# Patient Record
Sex: OTHER/UNKNOWN | URBAN - METROPOLITAN AREA
[De-identification: names, ages, dates, MRNs, and addresses within clinical notes are randomized per-mention and may not be internally consistent; named-entity substitution may affect disease eponyms.]

---

## 2017-09-21 ENCOUNTER — HISTORICAL (OUTPATIENT)
Dept: ADMINISTRATIVE | Facility: HOSPITAL | Age: 42
End: 2017-09-21

## 2017-09-21 LAB
ABS NEUT (OLG): 7.52 X10(3)/MCL (ref 2.1–9.2)
ALBUMIN SERPL-MCNC: 3.9 GM/DL (ref 3.4–5)
ALBUMIN/GLOB SERPL: 1.2 RATIO (ref 1.1–2)
ALP SERPL-CCNC: 54 UNIT/L (ref 50–136)
ALT SERPL-CCNC: 32 UNIT/L (ref 12–78)
AMYLASE SERPL-CCNC: 38 UNIT/L (ref 25–115)
APTT PPP: 24 SECOND(S) (ref 20.6–36)
AST SERPL-CCNC: 18 UNIT/L (ref 15–37)
BASOPHILS # BLD AUTO: 0 X10(3)/MCL (ref 0–0.2)
BASOPHILS NFR BLD AUTO: 0 %
BILIRUB SERPL-MCNC: 0.6 MG/DL (ref 0.2–1)
BILIRUBIN DIRECT+TOT PNL SERPL-MCNC: 0.2 MG/DL (ref 0–0.5)
BILIRUBIN DIRECT+TOT PNL SERPL-MCNC: 0.4 MG/DL (ref 0–0.8)
BUN SERPL-MCNC: 14 MG/DL (ref 7–18)
CALCIUM SERPL-MCNC: 9.6 MG/DL (ref 8.5–10.1)
CHLORIDE SERPL-SCNC: 108 MMOL/L (ref 98–107)
CO2 SERPL-SCNC: 23 MMOL/L (ref 21–32)
CREAT SERPL-MCNC: 1.26 MG/DL (ref 0.7–1.3)
EOSINOPHIL # BLD AUTO: 0.2 X10(3)/MCL (ref 0–0.9)
EOSINOPHIL NFR BLD AUTO: 2 %
ERYTHROCYTE [DISTWIDTH] IN BLOOD BY AUTOMATED COUNT: 14.2 % (ref 11.5–17)
ETHANOL SERPL-MCNC: 60 MG/DL (ref 0–3)
GLOBULIN SER-MCNC: 3.2 GM/DL (ref 2.4–3.5)
GLUCOSE SERPL-MCNC: 104 MG/DL (ref 74–106)
HCT VFR BLD AUTO: 47.3 % (ref 42–52)
HGB BLD-MCNC: 16.5 GM/DL (ref 14–18)
INR PPP: 1.01 (ref 0–1.27)
LACTATE SERPL-SCNC: 2.1 MMOL/L (ref 0.4–2)
LIPASE SERPL-CCNC: 143 UNIT/L (ref 73–393)
LYMPHOCYTES # BLD AUTO: 2 X10(3)/MCL (ref 0.6–4.6)
LYMPHOCYTES NFR BLD AUTO: 19 %
MCH RBC QN AUTO: 33.2 PG (ref 27–31)
MCHC RBC AUTO-ENTMCNC: 34.9 GM/DL (ref 33–36)
MCV RBC AUTO: 95.2 FL (ref 80–94)
MONOCYTES # BLD AUTO: 0.6 X10(3)/MCL (ref 0.1–1.3)
MONOCYTES NFR BLD AUTO: 5 %
NEUTROPHILS # BLD AUTO: 7.52 X10(3)/MCL (ref 1.4–7.9)
NEUTROPHILS NFR BLD AUTO: 72 %
PLATELET # BLD AUTO: 219 X10(3)/MCL (ref 130–400)
PMV BLD AUTO: 10.1 FL (ref 9.4–12.4)
POTASSIUM SERPL-SCNC: 4.4 MMOL/L (ref 3.5–5.1)
PROT SERPL-MCNC: 7.1 GM/DL (ref 6.4–8.2)
PROTHROMBIN TIME: 13.1 SECOND(S) (ref 12.1–14.2)
RBC # BLD AUTO: 4.97 X10(6)/MCL (ref 4.7–6.1)
SODIUM SERPL-SCNC: 142 MMOL/L (ref 136–145)
WBC # SPEC AUTO: 10.4 X10(3)/MCL (ref 4.5–11.5)

## 2024-12-31 ENCOUNTER — HOSPITAL ENCOUNTER (INPATIENT)
Facility: HOSPITAL | Age: 49
LOS: 6 days | Discharge: HOME | DRG: 065 | End: 2025-01-06
Attending: STUDENT IN AN ORGANIZED HEALTH CARE EDUCATION/TRAINING PROGRAM | Admitting: STUDENT IN AN ORGANIZED HEALTH CARE EDUCATION/TRAINING PROGRAM
Payer: MEDICAID

## 2024-12-31 DIAGNOSIS — R29.818 FOCAL NEUROLOGICAL DEFICIT: ICD-10-CM

## 2024-12-31 DIAGNOSIS — I61.0 BASAL GANGLIA HEMORRHAGE: Primary | ICD-10-CM

## 2024-12-31 DIAGNOSIS — R29.818 ACUTE FOCAL NEUROLOGICAL DEFICIT: ICD-10-CM

## 2024-12-31 DIAGNOSIS — S06.320A INTRAPARENCHYMAL HEMATOMA OF BRAIN, LEFT, WITHOUT LOSS OF CONSCIOUSNESS, INITIAL ENCOUNTER: ICD-10-CM

## 2024-12-31 DIAGNOSIS — I63.9 CEREBROVASCULAR ACCIDENT (CVA), UNSPECIFIED MECHANISM: ICD-10-CM

## 2024-12-31 DIAGNOSIS — L53.9 REDNESS: ICD-10-CM

## 2024-12-31 LAB
ALBUMIN SERPL-MCNC: 4 G/DL (ref 3.5–5)
ALBUMIN/GLOB SERPL: 1.6 RATIO (ref 1.1–2)
ALP SERPL-CCNC: 63 UNIT/L (ref 40–150)
ALT SERPL-CCNC: 30 UNIT/L (ref 0–55)
ANION GAP SERPL CALC-SCNC: 7 MEQ/L
AST SERPL-CCNC: 14 UNIT/L (ref 5–34)
BASOPHILS # BLD AUTO: 0.04 X10(3)/MCL
BASOPHILS NFR BLD AUTO: 0.5 %
BILIRUB SERPL-MCNC: 0.5 MG/DL
BUN SERPL-MCNC: 17.6 MG/DL (ref 8.9–20.6)
CALCIUM SERPL-MCNC: 9 MG/DL (ref 8.4–10.2)
CHLORIDE SERPL-SCNC: 107 MMOL/L (ref 98–107)
CHOLEST SERPL-MCNC: 188 MG/DL
CHOLEST/HDLC SERPL: 5 {RATIO} (ref 0–5)
CO2 SERPL-SCNC: 25 MMOL/L (ref 22–29)
CREAT SERPL-MCNC: 1.24 MG/DL (ref 0.72–1.25)
CREAT/UREA NIT SERPL: 14
EOSINOPHIL # BLD AUTO: 0.2 X10(3)/MCL (ref 0–0.9)
EOSINOPHIL NFR BLD AUTO: 2.5 %
ERYTHROCYTE [DISTWIDTH] IN BLOOD BY AUTOMATED COUNT: 14.1 % (ref 11.5–17)
GFR SERPLBLD CREATININE-BSD FMLA CKD-EPI: 54 ML/MIN/1.73/M2
GLOBULIN SER-MCNC: 2.5 GM/DL (ref 2.4–3.5)
GLUCOSE SERPL-MCNC: 114 MG/DL (ref 74–100)
HCT VFR BLD AUTO: 47.7 % (ref 42–52)
HDLC SERPL-MCNC: 41 MG/DL (ref 35–60)
HGB BLD-MCNC: 16.1 G/DL (ref 14–18)
IMM GRANULOCYTES # BLD AUTO: 0.04 X10(3)/MCL (ref 0–0.04)
IMM GRANULOCYTES NFR BLD AUTO: 0.5 %
INR PPP: 1
LDLC SERPL CALC-MCNC: 111 MG/DL (ref 50–140)
LYMPHOCYTES # BLD AUTO: 1.74 X10(3)/MCL (ref 0.6–4.6)
LYMPHOCYTES NFR BLD AUTO: 21.8 %
MCH RBC QN AUTO: 31.4 PG (ref 27–31)
MCHC RBC AUTO-ENTMCNC: 33.8 G/DL (ref 33–36)
MCV RBC AUTO: 93 FL (ref 80–94)
MONOCYTES # BLD AUTO: 0.65 X10(3)/MCL (ref 0.1–1.3)
MONOCYTES NFR BLD AUTO: 8.2 %
NEUTROPHILS # BLD AUTO: 5.3 X10(3)/MCL (ref 2.1–9.2)
NEUTROPHILS NFR BLD AUTO: 66.5 %
NRBC BLD AUTO-RTO: 0 %
OHS QRS DURATION: 110 MS
OHS QTC CALCULATION: 458 MS
PLATELET # BLD AUTO: 176 X10(3)/MCL (ref 130–400)
PMV BLD AUTO: 10.5 FL (ref 7.4–10.4)
POCT GLUCOSE: 124 MG/DL (ref 70–110)
POTASSIUM SERPL-SCNC: 4 MMOL/L (ref 3.5–5.1)
PROT SERPL-MCNC: 6.5 GM/DL (ref 6.4–8.3)
PROTHROMBIN TIME: 13.5 SECONDS (ref 12.5–14.5)
RBC # BLD AUTO: 5.13 X10(6)/MCL
SODIUM SERPL-SCNC: 139 MMOL/L (ref 136–145)
TRIGL SERPL-MCNC: 178 MG/DL (ref 34–140)
TROPONIN I SERPL-MCNC: <0.01 NG/ML (ref 0–0.04)
TSH SERPL-ACNC: 1.66 UIU/ML (ref 0.35–4.94)
VLDLC SERPL CALC-MCNC: 36 MG/DL
WBC # BLD AUTO: 7.97 X10(3)/MCL (ref 4.5–11.5)

## 2024-12-31 PROCEDURE — 82962 GLUCOSE BLOOD TEST: CPT

## 2024-12-31 PROCEDURE — 99291 CRITICAL CARE FIRST HOUR: CPT

## 2024-12-31 PROCEDURE — S4991 NICOTINE PATCH NONLEGEND: HCPCS

## 2024-12-31 PROCEDURE — 25000003 PHARM REV CODE 250: Performed by: NURSE PRACTITIONER

## 2024-12-31 PROCEDURE — 63600175 PHARM REV CODE 636 W HCPCS

## 2024-12-31 PROCEDURE — 80053 COMPREHEN METABOLIC PANEL: CPT | Performed by: NURSE PRACTITIONER

## 2024-12-31 PROCEDURE — 80061 LIPID PANEL: CPT | Performed by: NURSE PRACTITIONER

## 2024-12-31 PROCEDURE — 25500020 PHARM REV CODE 255: Performed by: STUDENT IN AN ORGANIZED HEALTH CARE EDUCATION/TRAINING PROGRAM

## 2024-12-31 PROCEDURE — 99233 SBSQ HOSP IP/OBS HIGH 50: CPT | Mod: ,,, | Performed by: PSYCHIATRY & NEUROLOGY

## 2024-12-31 PROCEDURE — 63600175 PHARM REV CODE 636 W HCPCS: Performed by: STUDENT IN AN ORGANIZED HEALTH CARE EDUCATION/TRAINING PROGRAM

## 2024-12-31 PROCEDURE — 63600175 PHARM REV CODE 636 W HCPCS: Mod: JZ,JG

## 2024-12-31 PROCEDURE — 99223 1ST HOSP IP/OBS HIGH 75: CPT | Mod: FS,,, | Performed by: NEUROLOGICAL SURGERY

## 2024-12-31 PROCEDURE — 20000000 HC ICU ROOM

## 2024-12-31 PROCEDURE — 84484 ASSAY OF TROPONIN QUANT: CPT | Performed by: STUDENT IN AN ORGANIZED HEALTH CARE EDUCATION/TRAINING PROGRAM

## 2024-12-31 PROCEDURE — 93005 ELECTROCARDIOGRAM TRACING: CPT

## 2024-12-31 PROCEDURE — 84443 ASSAY THYROID STIM HORMONE: CPT | Performed by: NURSE PRACTITIONER

## 2024-12-31 PROCEDURE — C9248 INJ, CLEVIDIPINE BUTYRATE: HCPCS | Performed by: STUDENT IN AN ORGANIZED HEALTH CARE EDUCATION/TRAINING PROGRAM

## 2024-12-31 PROCEDURE — 85025 COMPLETE CBC W/AUTO DIFF WBC: CPT | Performed by: NURSE PRACTITIONER

## 2024-12-31 PROCEDURE — 85610 PROTHROMBIN TIME: CPT | Performed by: NURSE PRACTITIONER

## 2024-12-31 PROCEDURE — 93010 ELECTROCARDIOGRAM REPORT: CPT | Mod: ,,, | Performed by: INTERNAL MEDICINE

## 2024-12-31 PROCEDURE — 96374 THER/PROPH/DIAG INJ IV PUSH: CPT

## 2024-12-31 PROCEDURE — 25000003 PHARM REV CODE 250

## 2024-12-31 PROCEDURE — 63600175 PHARM REV CODE 636 W HCPCS: Performed by: NURSE PRACTITIONER

## 2024-12-31 PROCEDURE — 25000003 PHARM REV CODE 250: Performed by: STUDENT IN AN ORGANIZED HEALTH CARE EDUCATION/TRAINING PROGRAM

## 2024-12-31 PROCEDURE — 80047 BASIC METABLC PNL IONIZED CA: CPT

## 2024-12-31 RX ORDER — NICARDIPINE HYDROCHLORIDE 0.2 MG/ML
0-15 INJECTION INTRAVENOUS CONTINUOUS
Status: DISCONTINUED | OUTPATIENT
Start: 2024-12-31 | End: 2024-12-31

## 2024-12-31 RX ORDER — CARVEDILOL 3.12 MG/1
6.25 TABLET ORAL 2 TIMES DAILY
Status: DISCONTINUED | OUTPATIENT
Start: 2024-12-31 | End: 2025-01-06 | Stop reason: HOSPADM

## 2024-12-31 RX ORDER — LABETALOL HYDROCHLORIDE 5 MG/ML
10 INJECTION, SOLUTION INTRAVENOUS
Status: COMPLETED | OUTPATIENT
Start: 2024-12-31 | End: 2024-12-31

## 2024-12-31 RX ORDER — MUPIROCIN 20 MG/G
OINTMENT TOPICAL 2 TIMES DAILY
Status: DISPENSED | OUTPATIENT
Start: 2024-12-31 | End: 2025-01-05

## 2024-12-31 RX ORDER — IBUPROFEN 200 MG
1 TABLET ORAL DAILY
Status: DISCONTINUED | OUTPATIENT
Start: 2024-12-31 | End: 2025-01-06 | Stop reason: HOSPADM

## 2024-12-31 RX ORDER — ESMOLOL HYDROCHLORIDE 20 MG/ML
0-300 INJECTION, SOLUTION INTRAVENOUS CONTINUOUS
Status: DISCONTINUED | OUTPATIENT
Start: 2024-12-31 | End: 2025-01-03

## 2024-12-31 RX ORDER — LABETALOL HYDROCHLORIDE 5 MG/ML
10 INJECTION, SOLUTION INTRAVENOUS
Status: DISCONTINUED | OUTPATIENT
Start: 2024-12-31 | End: 2025-01-05

## 2024-12-31 RX ORDER — IBUPROFEN 200 MG
1 TABLET ORAL DAILY
Status: DISCONTINUED | OUTPATIENT
Start: 2025-01-01 | End: 2024-12-31

## 2024-12-31 RX ORDER — LOSARTAN POTASSIUM 50 MG/1
50 TABLET ORAL DAILY
Status: DISCONTINUED | OUTPATIENT
Start: 2024-12-31 | End: 2025-01-02

## 2024-12-31 RX ORDER — ESMOLOL HYDROCHLORIDE 10 MG/ML
500 INJECTION INTRAVENOUS ONCE
Status: COMPLETED | OUTPATIENT
Start: 2024-12-31 | End: 2024-12-31

## 2024-12-31 RX ORDER — BISACODYL 10 MG/1
10 SUPPOSITORY RECTAL DAILY PRN
Status: DISCONTINUED | OUTPATIENT
Start: 2024-12-31 | End: 2025-01-06 | Stop reason: HOSPADM

## 2024-12-31 RX ADMIN — LABETALOL HYDROCHLORIDE 10 MG: 5 INJECTION, SOLUTION INTRAVENOUS at 03:12

## 2024-12-31 RX ADMIN — CLEVIPIDINE 14 MG/HR: 0.5 EMULSION INTRAVENOUS at 05:12

## 2024-12-31 RX ADMIN — CLEVIPIDINE 16 MG/HR: 0.5 EMULSION INTRAVENOUS at 03:12

## 2024-12-31 RX ADMIN — CLEVIPIDINE 8 MG/HR: 0.5 EMULSION INTRAVENOUS at 08:12

## 2024-12-31 RX ADMIN — ESMOLOL HYDROCHLORIDE 50 MCG/KG/MIN: 20 INJECTION INTRAVENOUS at 04:12

## 2024-12-31 RX ADMIN — CLEVIPIDINE 16 MG/HR: 0.5 EMULSION INTRAVENOUS at 02:12

## 2024-12-31 RX ADMIN — CLEVIPIDINE 5 MG/HR: 0.5 EMULSION INTRAVENOUS at 10:12

## 2024-12-31 RX ADMIN — CLEVIPIDINE 2 MG/HR: 0.5 EMULSION INTRAVENOUS at 10:12

## 2024-12-31 RX ADMIN — CLEVIPIDINE 16 MG/HR: 0.5 EMULSION INTRAVENOUS at 11:12

## 2024-12-31 RX ADMIN — IOHEXOL 100 ML: 350 INJECTION, SOLUTION INTRAVENOUS at 09:12

## 2024-12-31 RX ADMIN — LABETALOL HYDROCHLORIDE 10 MG: 5 INJECTION, SOLUTION INTRAVENOUS at 10:12

## 2024-12-31 RX ADMIN — ESMOLOL HYDROCHLORIDE 70 MCG/KG/MIN: 20 INJECTION INTRAVENOUS at 08:12

## 2024-12-31 RX ADMIN — CLEVIPIDINE 16 MG/HR: 0.5 EMULSION INTRAVENOUS at 12:12

## 2024-12-31 RX ADMIN — LEVETIRACETAM 500 MG: 100 INJECTION, SOLUTION INTRAVENOUS at 12:12

## 2024-12-31 RX ADMIN — NICOTINE 1 PATCH: 21 PATCH, EXTENDED RELEASE TRANSDERMAL at 06:12

## 2024-12-31 RX ADMIN — MUPIROCIN: 20 OINTMENT TOPICAL at 08:12

## 2024-12-31 RX ADMIN — ESMOLOL HYDROCHLORIDE 50000 MCG: 100 INJECTION, SOLUTION INTRAVENOUS at 11:12

## 2024-12-31 RX ADMIN — LEVETIRACETAM 500 MG: 100 INJECTION, SOLUTION INTRAVENOUS at 08:12

## 2024-12-31 NOTE — H&P
[facility] - Emergency Dept  Pulmonary Critical Care Note    Patient Name: Ramon oPp  MRN: [id number]  Admission Date: 12/31/2024  Hospital Length of Stay: 0 days  Code Status: No Order  Attending Provider: Jean Pierre Colon MD  Primary Care Provider: No primary care provider on file.     Subjective:     HPI:   Ramon Pop is a 49 y.o. adult with no significant PMH who presented to the ED on 12/31/2024 with CC of stroke like symptoms. Patient states onset was this morning when he noticed his facial drooping with slurred speech and blurry vision. He also noticed weakness of right UE and LE. He states he could walk but feeling off-balance. He denies health issues and has no primary care physician. He admits smoking since 13 y.o. and been smoking 2 ppd x 30 years. He adds heavily drinking alcohol for 10 years and cutting down to 0.5-1 pint per month for past 3 years - last intake 2 weeks ago. He uses marijuana regularly. Endorses cocaine use-last intake 2 weeks ago. He denies HA, dizziness, CP, SOB, abdominal pain, urinary difficulties, constipation/diarrhea, and fall.     In ED, upon arrival /155. Other V/S stable. Given labetalol IV 10 mg and started on clevidipine.  CTH showed left basal ganglial hemorrhage without evidence of aneurysm or large vessel occlusion. Neurology and neurosurgery consulted. Critical care medicine consulted for admission to ICU for BP management and Q1H neurocheck.     Hospital Course/Significant events:  12/31/2024 - Admitted to ICU     24 Hour Interval History:  Pending    No past medical history on file.    No past surgical history on file.         No current outpatient medications  Current Inpatient Medications    Current Intravenous Infusions   clevidipine  0-16 mg/hr Intravenous Continuous 32 mL/hr at 12/31/24 1129 16 mg/hr at 12/31/24 1129    nicardipine  0-15 mg/hr Intravenous Continuous         ROS: neg unless stated above        Objective:     Intake/Output  "Summary (Last 24 hours) at 12/31/2024 1139  Last data filed at 12/31/2024 1031  Gross per 24 hour   Intake 4.74 ml   Output --   Net 4.74 ml     Vital Signs (Most Recent):  Temp: 98 °F (36.7 °C) (12/31/24 0930)  Pulse: 83 (12/31/24 1025)  Resp: 19 (12/31/24 1025)  BP: (!) 165/102 (12/31/24 1126)  SpO2: 100 % (12/31/24 1025)  Body mass index is 31.12 kg/m².  Weight: 99.8 kg (220 lb) Vital Signs (24h Range):  Temp:  [98 °F (36.7 °C)] 98 °F (36.7 °C)  Pulse:  [71-92] 83  Resp:  [15-20] 19  SpO2:  [95 %-100 %] 100 %  BP: (165-220)/(102-155) 165/102     Physical Exam:  General: Well nourished w/o distress  HEENT: NC/AT; PERRL; nasal and oral mucosa moist and clear  FACE: Dysarthria. Right facial drooping with asymmetrical smile  Pulm: CTA bilaterally, normal work of breathing  CV: S1, S2 w/o murmurs or gallops; no edema noted  GI: Bowel sound present in all quadrants  MSK: Full ROM of all extremities.    Derm: No rashes, abnormal bruising, or skin lesions  Neuro: AAOx4. Motor strength on RUE 4/5 and LUE 5/5.   Psych: Cooperative; appropriate mood and affect    Lines/Drains/Airways       Peripheral Intravenous Line  Duration                  Peripheral IV - Single Lumen 12/31/24 0940 18 G Anterior;Left Forearm <1 day         Peripheral IV - Single Lumen 12/31/24 1042 18 G Anterior;Right Forearm <1 day                  Significant Labs:  Lab Results   Component Value Date    WBC 7.97 12/31/2024    HGB 16.1 12/31/2024    HCT 47.7 12/31/2024    MCV 93.0 12/31/2024     12/31/2024       BMP  Lab Results   Component Value Date     12/31/2024    K 4.0 12/31/2024    CO2 25 12/31/2024    BUN 17.6 12/31/2024    CREATININE 1.24 12/31/2024    CALCIUM 9.0 12/31/2024    AGAP 7.0 12/31/2024    EGFRNONAA >60 09/21/2017     ABG  No results for input(s): "PH", "PO2", "PCO2", "HCO3", "BE" in the last 168 hours.  Mechanical Ventilation Support:       Significant Imaging:  I have reviewed the pertinent imaging within the past 24 " hours.    Assessment/Plan:     Assessment  Left basal ganglia hemorrhage  Emergency HTN  Tobacco dependent  Polysubstance use     Plan  Continue to monitor patient at ICU  Hourly neurological exams  Will repeat CTH at 1600 (12/31/2024)  BP control with -150 for first 24 hours. Then control SBP <140  Continue Keppra 500 mg IV Q12H for seizure precaution  Avoid anticoagulation/antiplatelet   PT/OT evaluation  Neurology and neurosurgery following    DVT Prophylaxis: SCD  GI Prophylaxis: None     32 minutes of critical care was time spent personally by me on the following activities: development of treatment plan with patient or surrogate and bedside caregivers, discussions with consultants, evaluation of patient's response to treatment, examination of patient, ordering and performing treatments and interventions, ordering and review of laboratory studies, ordering and review of radiographic studies, pulse oximetry, re-evaluation of patient's condition.  This critical care time did not overlap with that of any other provider or involve time for any procedures.     Jose Pierce MD PGY-1  Pulmonary Critical Care Medicine  [facility] - [facility] Dept

## 2024-12-31 NOTE — CONSULTS
[facility] - Emergency Dept  Neurology  Consult Note      Ramon Pop is a 49 y.o. adult who presented to [facility] on 12/31/2024 with reports of weakness of right arm(s), right hand(s), right upper leg(s), or right lower leg(s), right facial droop, slurred speech. Onset of symptoms was sudden, not related to any specific activity. Stroke risk factors include hypertension. Prior stroke history: unknown.       No past medical history on file.  No past surgical history on file.  No family history on file.      Review of patient's allergies indicates:   Allergen Reactions    Penicillins          STROKE DOCUMENTATION   Acute Stroke Times   Last Known Normal Date: 12/31/24  Last Known Normal Time: 0845  Symptom Onset Date: 12/31/24  Symptom Onset Time: 0845  Stroke Team Called Date: 12/31/24  Stroke Team Called Time: 0933  Stroke Team Arrival Date: 12/31/24  Stroke Team Arrival Time: 0936  Thrombolytic Therapy Recommended: No (CTH with new ICH)  Thrombectomy Recommended: No (CTH with new ICH)    NIH Scale:  1a. Level of Consciousness: 0-->Alert, keenly responsive  1b. LOC Questions: 0-->Answers both questions correctly  1c. LOC Commands: 0-->Performs both tasks correctly  2. Best Gaze: 0-->Normal  3. Visual: 0-->No visual loss  4. Facial Palsy: 1-->Minor paralysis (flattened nasolabial fold, asymmetry on smiling)  5a. Motor Arm, Left: 0-->No drift, limb holds 90 (or 45) degrees for full 10 secs  5b. Motor Arm, Right: 1-->Drift, limb holds 90 (or 45) degrees, but drifts down before full 10 secs, does not hit bed or other support  6a. Motor Leg, Left: 0-->No drift, leg holds 30 degree position for full 5 secs  6b. Motor Leg, Right: 1-->Drift, leg falls by the end of the 5-sec period but does not hit bed  7. Limb Ataxia: 0-->Absent  8. Sensory: 0-->Normal, no sensory loss  9. Best Language: 0-->No aphasia, normal  10. Dysarthria: 1-->Mild-to-moderate dysarthria, patient slurs at least some words and, at worst,  "can be understood with some difficulty  11. Extinction and Inattention (formerly Neglect): 0-->No abnormality  Total (NIH Stroke Scale): 4     Modified [place] Score: 2  [place] Coma Scale:15   ABCD2 Score:    [id number] Score:   HAS -BLED Score:   ICH Score:   Hunt & Ellis Classification:         Neurological Exam:   Speech: Dysarthria  Orientation: Person, Place, Time  Visual Fields (CN II): Full  EOM (CN III, IV, VI): Full/intact  Facial Sensation (CN V): Symmetric, Corneal reflex intact  Facial Movement (CN VII): upper weakness right upper and right lower and lower weakness right upper and right lower  Motor*: Arm Left:  Normal (5/5), Leg Left:   Normal (5/5), Arm Right:   Paretic:  4/5, Leg Right:   Paretic:  4/5  Sensation: intact to light touch, temperature and vibration        Laboratory:  BMP:   Lab Results   Component Value Date    GLUCOSE 104 09/21/2017     09/21/2017    K 4.4 09/21/2017     (H) 09/21/2017    CO2 23.0 09/21/2017    BUN 14.0 09/21/2017    CREATININE 1.26 09/21/2017    CALCIUM 9.6 09/21/2017     CBC:   Lab Results   Component Value Date    WBC 10.4 09/21/2017    RBC 4.97 09/21/2017    HGB 16.5 09/21/2017    HCT 47.3 09/21/2017     09/21/2017    MCV 95.2 (H) 09/21/2017    MCH 33.2 (H) 09/21/2017    MCHC 34.9 09/21/2017     Lipid Panel: No results found for: "CHOL", "LDLCALC", "HDL", "TRIG"  Coagulation:   Lab Results   Component Value Date    INR 1.01 09/21/2017    APTT 24.0 09/21/2017     Hgb A1C: No results found for: "HGBA1C"  TSH: No results found for: "TSH"    Diagnostic Results  Brain Imaging:   -CTh: positive for ICH  Cerebrovascular Imaging:   -CTA h/n: awaiting results         Discussed with neurologist on call: Dr. Pang notified of stroke alert at 0936; updated on ICH at 0945.  Thrombolysis Candidate? No, CT findings (ICH, SAH, Large core infarct)   -Delays to Thrombolysis?  Not Applicable    Interventional Revascularization Candidate? Awaiting CTA results " from [facility] for determination   -Delays to Thrombectomy? Not Applicable    Hemorrhagic change of an Ischemic Stroke: Does this patient have an ischemic stroke with hemorrhagic changes? No           PLAN:  IPH -      -hourly neuro checks ... notify neurology of any neuro change  -repeat CTH in 6 hours (1600)  -consult to neurosurgery  -strict blood pressure control ... -150 for the first 24 hours, then SBP less than 140  -avoid antiplatelet or anticoagulation at this time  -seizure precautions ... notify neurology of any seizure-like activity  -therapy evaluations     Further recommendations to follow from MD.      Maria R Perez, P  Neurology  [facility] - Emergency Dept      I have seen/examined the patient.  NP was scribe.  I agree with the findings unless outlined below:    Juan Pang MD  [facility]     Pt seen/examined  Mild R hemiparesis/facial droop  Ct shows L BG ICH  Hypertensive    BP parameters as above  Signing off

## 2024-12-31 NOTE — ED PROVIDER NOTES
Encounter Date: 12/31/2024       History     Chief Complaint   Patient presents with    Cerebrovascular Accident     Right arm weakness, right facial droop, slurred speech LSN 0845am. Woke up normal     Ramon Pop is a 49 y.o. adult with a past medical history of HTN (uncontrolled) who presents to the ED for acute onset of right-sided facial droop, slurred speech, right arm weakness that occurred at approximately 8:45 a.m. this morning.  He states he felt normal when he awoke from sleep this morning when his symptoms came on all of a sudden.  He states his only history is unknown hypertension although he is not currently on any treatment for this.         Review of patient's allergies indicates:   Allergen Reactions    Penicillins      No past medical history on file.  No past surgical history on file.  No family history on file.     Review of Systems   Constitutional:  Negative for chills and fever.   HENT:  Negative for drooling and sore throat.    Eyes:  Negative for pain and redness.   Respiratory:  Negative for shortness of breath, wheezing and stridor.    Cardiovascular:  Negative for chest pain, palpitations and leg swelling.   Gastrointestinal:  Negative for abdominal pain, nausea and vomiting.   Genitourinary:  Negative for dysuria and hematuria.   Musculoskeletal:  Negative for back pain, neck pain and neck stiffness.   Skin:  Negative for rash and wound.   Neurological:  Positive for facial asymmetry, speech difficulty and weakness. Negative for numbness.   Hematological:  Does not bruise/bleed easily.       Physical Exam     Initial Vitals [12/31/24 0930]   BP Pulse Resp Temp SpO2   (!) 220/155 92 20 98 °F (36.7 °C) 97 %      MAP       --         Physical Exam    Nursing note and vitals reviewed.  Constitutional: Ramon appears well-developed and well-nourished.   HENT:   Head: Atraumatic.   Eyes: EOM are normal.   Neck: Neck supple.   Cardiovascular:  Normal rate and regular rhythm.            Pulmonary/Chest: Breath sounds normal. No respiratory distress.   Abdominal: Abdomen is soft. Bowel sounds are normal.   Musculoskeletal:      Cervical back: Neck supple.     Neurological: Ramon is alert and oriented to person, place, and time. A cranial nerve deficit and sensory deficit is present.   Right upper extremity weakness 4/5, right lower extremity weakness 4/5          ED Course   Critical Care    Date/Time: 12/31/2024 10:29 AM    Performed by: Chung Santoyo MD  Authorized by: Chung Santoyo MD  Direct patient critical care time: 40 minutes  Total critical care time (exclusive of procedural time) : 40 minutes  Critical care time was exclusive of separately billable procedures and treating other patients.  Critical care was necessary to treat or prevent imminent or life-threatening deterioration of the following conditions: CNS failure or compromise.  Critical care was time spent personally by me on the following activities: discussions with consultants, development of treatment plan with patient or surrogate, evaluation of patient's response to treatment, examination of patient, obtaining history from patient or surrogate, ordering and performing treatments and interventions, ordering and review of laboratory studies, ordering and review of radiographic studies, pulse oximetry, re-evaluation of patient's condition and review of old charts.        Labs Reviewed   CBC W/ AUTO DIFFERENTIAL    Narrative:     The following orders were created for panel order CBC W/ AUTO DIFFERENTIAL.  Procedure                               Abnormality         Status                     ---------                               -----------         ------                     CBC with Differential[[id number]]                           In process                   Please view results for these tests on the individual orders.   COMPREHENSIVE METABOLIC PANEL   PROTIME-INR   TSH   LIPID PANEL   CBC WITH DIFFERENTIAL    TROPONIN I   POCT GLUCOSE, HAND-HELD DEVICE          Imaging Results              CTA Head and Neck (xpd) (Final result)  Result time 12/31/24 10:07:07      Final result by Israel Alvarez MD (12/31/24 10:07:07)                   Impression:        No evidence of aneurysm or large vessel occlusion seen    Stable left basal ganglia hemorrhage      Electronically signed by: Albert Alvarez  Date:    12/31/2024  Time:    10:07               Narrative:    EXAMINATION:  CTA HEAD AND NECK (XPD)    CLINICAL HISTORY:  Neuro deficit, acute, stroke suspected;    TECHNIQUE:  Non contrast low dose axial images were obtained through the head.  CT angiogram was performed from the level of the elaine to the top of the head following the IV administration 100 cc of Isovue 370 contrast .  Sagittal and coronal reconstructions and maximum intensity projection reconstructions were performed. Arterial stenosis percentages are based on NASCET measurement criteria.  Additional multiplanar reconstructions were performed on post processed imaging.  Automatic exposure control (AEC) is utilized to reduce patient radiation exposure.    COMPARISON:  None    FINDINGS:  Source images: No intracranial mass lesion is seen.  There is a focal rounded area hemorrhage in the basal ganglia side which is stable..  No infarct is seen.  Ventricles and basilar cisterns appear grossly unremarkable.  No cervical mass or lesion is seen.  No abnormal lymphadenopathy seen.  Thyroid appears normal.  Larynx appears normal.  Trachea is midline.  Thoracic inlet appears normal.    Vascular images: Aortic arch is normal in caliber.  There is a 3 vessel arch seen.  The common carotid arteries are widely patent.  No significant plaque is seen in the carotid bulbs or proximal internal carotid arteries.  The distal internal carotid arteries are widely patent and seen to level the petrous ridge and clinoid supraclinoid portions with only minimal amount of  calcifications seen in the clinoid portion of the right internal carotid artery.  No significant stenosis is seen.    The MCAs are patent.  The M1 segments, M2 segments M3 segments are patent.  No aneurysm or obstruction is seen.  A1 segments are patent.  Anterior cerebral arteries are widely patent.  No aneurysm or obstruction is seen.    Both vertebral arteries are patent.  Left vertebral artery is dominant.  No evidence of dissection is seen.  No fibromuscular dysplasia is seen.  Both vertebral arteries perfuse a normal appearing basilar artery.    The posterior cerebral arteries are widely patent.  No aneurysm or obstruction is seen.    .                                       CT HEAD FOR STROKE (Final result)  Result time 12/31/24 09:49:23      Final result by Mali Simpson MD (12/31/24 09:49:23)                   Impression:      Small left basal ganglia hematoma.    Code fast findings given to Dr. Huerta at the time of dictation.      Electronically signed by: Mali Simpson  Date:    12/31/2024  Time:    09:49               Narrative:    EXAMINATION:  CT HEAD FOR STROKE    CLINICAL HISTORY:  Neuro deficit, acute, stroke suspected;    TECHNIQUE:  Axial scans were obtained from skull base to the vertex.    Coronal and sagittal reconstructions obtained from the axial data.    Automatic exposure control was utilized to limit radiation dose.    Contrast: None    Radiation Dose:    Total DLP: 1017 mGy*cm    COMPARISON:  None    FINDINGS:  Small left basal ganglia hematoma measures 1.7 cm in size with mild surrounding edema.  Patchy hypodensities in the subcortical and periventricular white matter likely represent chronic microvascular ischemic changes.    There is no significant mass effect or midline shift.  The basal cisterns are patent.  The ventricles are normal in size.  There is no abnormal extra-axial fluid collection.  The calvarium and skull base are intact.                                        Medications   clevidipine (CLEVIPREX) 25 mg/50 mL infusion (12 mg/hr Intravenous Verify Only 12/31/24 1031)   iohexoL (OMNIPAQUE 350) injection 100 mL (100 mLs Intravenous Given 12/31/24 0953)     Medical Decision Making  Amount and/or Complexity of Data Reviewed  Radiology: ordered. Decision-making details documented in ED Course.    Risk  Prescription drug management.  Decision regarding hospitalization.      Differential diagnosis (includes but is not limited to):   CVA, TIA, ICH, Bell's palsy, peripheral neuropathy, arrhythmia, electrolyte abnormality, ACS, dehydration, kidney injury    MDM Narrative  49-year-old male presents as a code fast activation from triage.  Per triage report, patient reported acute onset of right-sided facial droop, right arm numbness and weakness, right leg weakness, slurred speech that started at 8:45 a.m. this morning.  He states he had no symptoms when he awoke this morning in his symptoms started suddenly at 8:45 a.m. stroke neurology team at bedside for assessment.  Patient expedited to the CT scanner.  Patient found to have a 1.7 cm left basal ganglia hemorrhage with no mass effect or midline shift.  Patient is not on any anticoagulant medications.  CTA of the head and neck performed and is negative for aneurysm.  Cleviprex initiated due to persistent severe hypertension in the 220-240 range.  Stroke neurology on board, will continue to follow up.  Case discussed with Neurosurgery, will follow in consult.  Case discussed with ICU, will admit for further care.    Dispo: Admit    My independent radiology interpretation: as above  Point of care US (independently performed and interpreted):   Decision rules/clinical scoring:     Sepsis Perfusion Assessment:     Amount and/or Complexity of Data Reviewed  Independent historian:    Summary of history:   External data reviewed: no prior records available for review   Summary of data reviewed:   Risk and benefits of testing: discussed    Labs: ordered and reviewed  Radiology: ordered and independent interpretation performed (see above or ED course)  ECG/medicine tests: ordered and independent interpretation performed (see above or ED course)  Discussion of management or test interpretation with external provider(s): discussed with ICU, neurosurgery, neurology consultant   Summary of discussion: as above    Risk  Parenteral controlled substances   Drug therapy requiring intense monitoring for toxicity   Decision regarding hospitalization  Shared decision making     Critical Care  30-74 minutes     Data Reviewed/Counseling: I have personally reviewed the patient's vital signs, nursing notes, and other relevant tests, information, and imaging. I had a detailed discussion regarding the historical points, exam findings, and any diagnostic results supporting the discharge diagnosis. I personally performed the history, PE, MDM and procedures as documented above and agree with the scribe's documentation.    Portions of this note were dictated using voice recognition software. Although it was reviewed for accuracy, some inherent voice recognition errors may have occurred and may be present in this document.             ED Course as of 12/31/24 1141   Tue Dec 31, 2024   0945 CT HEAD FOR STROKE  Wet read with left sided intraparenchymal hemorrhage, patient not a tnk candidate given [facility], will proceed with CTA and discuss with neuro and neurosurgery. [MC]   0945 Stroke neurology at bedside [MC]   0950 NIH stroke scale 4 [MC]   0952 CT HEAD FOR STROKE  Radiology contacted me to notify me of a 1.7 cm left-sided basal ganglia hemorrhage with no vasogenic edema or midline shift. [MC]   1017 Case discussed with Dr. Donnelly, neurosurgery, will follow in consult. [MC]   1019 Discussed with ICU, will admit [MC]   1141 EKG independently interpreted by me.  EKG: NSR @ 79, no STEMI, Qtc 458 [MC]   1141 X-Ray Chest AP Portable  Independently visualized/reviewed by me  during the ED visit.  - No acute lobar consolidation or PTX [MC]      ED Course User Index  [MC] Chung Santoyo MD                           Clinical Impression:  Final diagnoses:  [R29.818] Acute focal neurological deficit  [R29.818] Focal neurological deficit  [I61.0] Basal ganglia hemorrhage (Primary)  [S06.320A] Intraparenchymal hematoma of brain, left, without loss of consciousness, initial encounter  [I63.9] Cerebrovascular accident (CVA), unspecified mechanism          ED Disposition Condition    Admit Stable                Chung Santoyo MD  12/31/24 1145

## 2024-12-31 NOTE — CONSULTS
[facility] - Emergency Dept  Neurosurgery  Consult Note    Inpatient consult to Neurosurgery  Consult performed by: Mao Cabrera AGACNP-BC  Consult ordered by: Chung Santoyo MD        Subjective:     Chief Complaint/Reason for Admission:  Right arm weakness, right facial droop, slurred speech.    History of Present Illness:  This is a very pleasant 49-year-old  male with past medical history significant for uncontrolled hypertension who presents with right-sided facial droop, slurred speech, right arm weakness that occurred at 8:45 a.m. this morning.  Patient tells me that 6 years ago when he went to have teeth pulled at the dentist he discovered he was hypertensive but never followed up and does not take any medications.  He does smokes marijuana and cigarettes.    Stroke neurology and Neurosurgery were consulted.    CTA head and neck 12/31/2024: No evidence of aneurysm or LVO.    CT head without contrast: Small left basal ganglia hematoma.      On physical exam the patient is sitting up on the stretcher very pleasant and conversant.  He does have profound right facial droop, right arm and leg weakness.  He does not have a headache currently.  He is maxed out on Cleviprex currently in his blood pressure is still not within goal in his in the high 180s systolic.  ICU was notified and added additional IV push drugs but he may require a 2nd drip and better control to prevent further hematoma expansion.    (Not in a hospital admission)      Review of patient's allergies indicates:   Allergen Reactions    Penicillins        No past medical history on file.  No past surgical history on file.  Family History    None       Tobacco Use    Smoking status: Not on file    Smokeless tobacco: Not on file   Substance and Sexual Activity    Alcohol use: Not on file    Drug use: Not on file    Sexual activity: Not on file     Review of Systems   Neurological:  Positive for facial asymmetry,  speech difficulty and weakness.     Objective:     Weight: 99.8 kg (220 lb)  Body mass index is 31.12 kg/m².  Vital Signs (Most Recent):  Temp: 98 °F (36.7 °C) (12/31/24 0930)  Pulse: 86 (12/31/24 1135)  Resp: 17 (12/31/24 1135)  BP: (!) 140/100 (12/31/24 1135)  SpO2: 96 % (12/31/24 1135) Vital Signs (24h Range):  Temp:  [98 °F (36.7 °C)] 98 °F (36.7 °C)  Pulse:  [71-92] 86  Resp:  [15-24] 17  SpO2:  [94 %-100 %] 96 %  BP: (140-220)/() 140/100     Date 12/31/24 0700 - 01/01/25 0659   Shift 3715-6537 9207-9698 8132-9744 24 Hour Total   INTAKE   I.V.(mL/kg) 4.7(0)   4.7(0)   Shift Total(mL/kg) 4.7(0)   4.7(0)   OUTPUT   Shift Total(mL/kg)       Weight (kg) 99.8 99.8 99.8 99.8                            Physical Exam:  Nursing note and vitals reviewed.    Constitutional: Ramon appears well-developed and well-nourished. Ramon is not diaphoretic. No distress.     Eyes: Pupils are equal, round, and reactive to light. Conjunctivae and EOM are normal.     Cardiovascular: Normal rate.     Abdominal: Soft.     Skin: Skin displays no rash on trunk. Skin displays no lesions on trunk.     Psych/Behavior: Ramon is alert. Ramon is oriented to person, place, and time. Ramon has a normal mood and affect.     Neurological:   GCS is 15   Awake oriented and conversant   Follows commands   PERRLA   Vision grossly intact   Slurred speech/dysarthria  Right facial droop  Right upper and lower extremity 4/5-sensation intact to light touch  Left upper and lower extremity 5/5-sensation intact.  Right drift       Significant Labs:  Recent Labs   Lab 12/31/24  1025      K 4.0      CO2 25   BUN 17.6   CREATININE 1.24   CALCIUM 9.0     Recent Labs   Lab 12/31/24  1025   WBC 7.97   HGB 16.1   HCT 47.7        Recent Labs   Lab 12/31/24  1025   INR 1.0     Microbiology Results (last 7 days)       ** No results found for the last 168 hours. **            Assessment/Plan:    Need better blood pressure control at this time  as patient is still in the 180s systolic.  Currently maxed out on Cleviprex, IV meds just given.  If this does not work consider a 2nd drip.    Systolic blood pressure 130-150 in the 1st 24 hours to prevent hematoma expansion.    Seizure precautions Keppra   Avoid anticoagulation   PTOT ST eval   SCDs   Hourly neurological exams   Neurovascular following   Repeat CT head at 4:00 p.m. today        There are no hospital problems to display for this patient.      Thank you for your consult. I will follow-up with patient. Please contact us if you have any additional questions.    Mao Cabrera, [facility]  Neurosurgery  [facility] - Emergency Dept

## 2024-12-31 NOTE — PLAN OF CARE
Problem: Adult Inpatient Plan of Care  Goal: Plan of Care Review  Outcome: Progressing  Goal: Patient-Specific Goal (Individualized)  Outcome: Progressing  Goal: Absence of Hospital-Acquired Illness or Injury  Outcome: Progressing  Goal: Optimal Comfort and Wellbeing  Outcome: Progressing  Goal: Readiness for Transition of Care  Outcome: Progressing     Problem: Stroke, Intracerebral Hemorrhage  Goal: Optimal Coping  Outcome: Progressing  Goal: Effective Bowel Elimination  Outcome: Progressing  Goal: Optimal Cerebral Tissue Perfusion  Outcome: Progressing  Goal: Optimal Cognitive Function  Outcome: Progressing  Goal: Effective Communication Skills  Outcome: Progressing  Goal: Optimal Functional Ability  Outcome: Progressing  Goal: Optimal Nutrition Intake  Outcome: Progressing  Goal: Optimal Pain Control and Function  Outcome: Progressing  Goal: Effective Oxygenation and Ventilation  Outcome: Progressing  Goal: Improved Sensorimotor Function  Outcome: Progressing  Goal: Safe and Effective Swallow  Outcome: Progressing  Goal: Effective Urinary Elimination  Outcome: Progressing

## 2025-01-01 LAB
ALBUMIN SERPL-MCNC: 4.2 G/DL (ref 3.5–5)
ALBUMIN/GLOB SERPL: 1.6 RATIO (ref 1.1–2)
ALP SERPL-CCNC: 64 UNIT/L (ref 40–150)
ALT SERPL-CCNC: 32 UNIT/L (ref 0–55)
ANION GAP SERPL CALC-SCNC: 13 MEQ/L
AST SERPL-CCNC: 17 UNIT/L (ref 5–34)
BASOPHILS # BLD AUTO: 0.03 X10(3)/MCL
BASOPHILS NFR BLD AUTO: 0.4 %
BILIRUB SERPL-MCNC: 0.7 MG/DL
BUN SERPL-MCNC: 10.6 MG/DL (ref 8.9–20.6)
CALCIUM SERPL-MCNC: 9 MG/DL (ref 8.4–10.2)
CHLORIDE SERPL-SCNC: 108 MMOL/L (ref 98–107)
CO2 SERPL-SCNC: 19 MMOL/L (ref 22–29)
CREAT SERPL-MCNC: 1.25 MG/DL (ref 0.72–1.25)
CREAT/UREA NIT SERPL: 8
EOSINOPHIL # BLD AUTO: 0.2 X10(3)/MCL (ref 0–0.9)
EOSINOPHIL NFR BLD AUTO: 2.6 %
ERYTHROCYTE [DISTWIDTH] IN BLOOD BY AUTOMATED COUNT: 14.4 % (ref 11.5–17)
GFR SERPLBLD CREATININE-BSD FMLA CKD-EPI: >60 ML/MIN/1.73/M2
GLOBULIN SER-MCNC: 2.7 GM/DL (ref 2.4–3.5)
GLUCOSE SERPL-MCNC: 103 MG/DL (ref 74–100)
HCT VFR BLD AUTO: 49.4 % (ref 42–52)
HGB BLD-MCNC: 16.3 G/DL (ref 14–18)
IMM GRANULOCYTES # BLD AUTO: 0.01 X10(3)/MCL (ref 0–0.04)
IMM GRANULOCYTES NFR BLD AUTO: 0.1 %
LYMPHOCYTES # BLD AUTO: 2.13 X10(3)/MCL (ref 0.6–4.6)
LYMPHOCYTES NFR BLD AUTO: 27.4 %
MAGNESIUM SERPL-MCNC: 2.1 MG/DL (ref 1.6–2.6)
MCH RBC QN AUTO: 31 PG (ref 27–31)
MCHC RBC AUTO-ENTMCNC: 33 G/DL (ref 33–36)
MCV RBC AUTO: 93.9 FL (ref 80–94)
MONOCYTES # BLD AUTO: 0.53 X10(3)/MCL (ref 0.1–1.3)
MONOCYTES NFR BLD AUTO: 6.8 %
NEUTROPHILS # BLD AUTO: 4.88 X10(3)/MCL (ref 2.1–9.2)
NEUTROPHILS NFR BLD AUTO: 62.7 %
NRBC BLD AUTO-RTO: 0 %
PHOSPHATE SERPL-MCNC: 3.5 MG/DL (ref 2.3–4.7)
PLATELET # BLD AUTO: 214 X10(3)/MCL (ref 130–400)
PMV BLD AUTO: 11.4 FL (ref 7.4–10.4)
POTASSIUM SERPL-SCNC: 4.4 MMOL/L (ref 3.5–5.1)
PROT SERPL-MCNC: 6.9 GM/DL (ref 6.4–8.3)
RBC # BLD AUTO: 5.26 X10(6)/MCL (ref 4.7–6.1)
SODIUM SERPL-SCNC: 140 MMOL/L (ref 136–145)
WBC # BLD AUTO: 7.78 X10(3)/MCL (ref 4.5–11.5)

## 2025-01-01 PROCEDURE — 85025 COMPLETE CBC W/AUTO DIFF WBC: CPT

## 2025-01-01 PROCEDURE — 25000003 PHARM REV CODE 250: Performed by: NURSE PRACTITIONER

## 2025-01-01 PROCEDURE — 97166 OT EVAL MOD COMPLEX 45 MIN: CPT

## 2025-01-01 PROCEDURE — S4991 NICOTINE PATCH NONLEGEND: HCPCS

## 2025-01-01 PROCEDURE — C9248 INJ, CLEVIDIPINE BUTYRATE: HCPCS

## 2025-01-01 PROCEDURE — 25000003 PHARM REV CODE 250: Performed by: STUDENT IN AN ORGANIZED HEALTH CARE EDUCATION/TRAINING PROGRAM

## 2025-01-01 PROCEDURE — 25000003 PHARM REV CODE 250

## 2025-01-01 PROCEDURE — 20000000 HC ICU ROOM

## 2025-01-01 PROCEDURE — 84100 ASSAY OF PHOSPHORUS: CPT

## 2025-01-01 PROCEDURE — 63600175 PHARM REV CODE 636 W HCPCS

## 2025-01-01 PROCEDURE — 36415 COLL VENOUS BLD VENIPUNCTURE: CPT

## 2025-01-01 PROCEDURE — C9248 INJ, CLEVIDIPINE BUTYRATE: HCPCS | Mod: TB

## 2025-01-01 PROCEDURE — 97162 PT EVAL MOD COMPLEX 30 MIN: CPT

## 2025-01-01 PROCEDURE — 83735 ASSAY OF MAGNESIUM: CPT

## 2025-01-01 PROCEDURE — 63600175 PHARM REV CODE 636 W HCPCS: Mod: TB | Performed by: STUDENT IN AN ORGANIZED HEALTH CARE EDUCATION/TRAINING PROGRAM

## 2025-01-01 PROCEDURE — 80053 COMPREHEN METABOLIC PANEL: CPT

## 2025-01-01 PROCEDURE — 63600175 PHARM REV CODE 636 W HCPCS: Performed by: STUDENT IN AN ORGANIZED HEALTH CARE EDUCATION/TRAINING PROGRAM

## 2025-01-01 PROCEDURE — 63600175 PHARM REV CODE 636 W HCPCS: Mod: TB

## 2025-01-01 PROCEDURE — C9248 INJ, CLEVIDIPINE BUTYRATE: HCPCS | Performed by: STUDENT IN AN ORGANIZED HEALTH CARE EDUCATION/TRAINING PROGRAM

## 2025-01-01 PROCEDURE — 63600175 PHARM REV CODE 636 W HCPCS: Performed by: NURSE PRACTITIONER

## 2025-01-01 PROCEDURE — 92610 EVALUATE SWALLOWING FUNCTION: CPT

## 2025-01-01 PROCEDURE — C9248 INJ, CLEVIDIPINE BUTYRATE: HCPCS | Mod: TB | Performed by: STUDENT IN AN ORGANIZED HEALTH CARE EDUCATION/TRAINING PROGRAM

## 2025-01-01 RX ORDER — HYDROXYZINE 50 MG/ML
50 INJECTION, SOLUTION INTRAMUSCULAR EVERY 6 HOURS PRN
Status: DISCONTINUED | OUTPATIENT
Start: 2025-01-01 | End: 2025-01-06 | Stop reason: HOSPADM

## 2025-01-01 RX ADMIN — LEVETIRACETAM 500 MG: 100 INJECTION, SOLUTION INTRAVENOUS at 08:01

## 2025-01-01 RX ADMIN — HYDROXYZINE HYDROCHLORIDE 50 MG: 50 INJECTION, SOLUTION INTRAMUSCULAR at 10:01

## 2025-01-01 RX ADMIN — LOSARTAN POTASSIUM 50 MG: 50 TABLET, FILM COATED ORAL at 11:01

## 2025-01-01 RX ADMIN — CARVEDILOL 6.25 MG: 3.12 TABLET, FILM COATED ORAL at 11:01

## 2025-01-01 RX ADMIN — CLEVIPIDINE 16 MG/HR: 0.5 EMULSION INTRAVENOUS at 12:01

## 2025-01-01 RX ADMIN — NICOTINE 1 PATCH: 21 PATCH, EXTENDED RELEASE TRANSDERMAL at 09:01

## 2025-01-01 RX ADMIN — CLEVIPIDINE 6 MG/HR: 0.5 EMULSION INTRAVENOUS at 08:01

## 2025-01-01 RX ADMIN — CLEVIPIDINE 8 MG/HR: 0.5 EMULSION INTRAVENOUS at 12:01

## 2025-01-01 RX ADMIN — CLEVIPIDINE 1 MG/HR: 0.5 EMULSION INTRAVENOUS at 05:01

## 2025-01-01 RX ADMIN — CLEVIPIDINE 12 MG/HR: 0.5 EMULSION INTRAVENOUS at 10:01

## 2025-01-01 RX ADMIN — MUPIROCIN: 20 OINTMENT TOPICAL at 09:01

## 2025-01-01 RX ADMIN — CARVEDILOL 6.25 MG: 3.12 TABLET, FILM COATED ORAL at 08:01

## 2025-01-01 RX ADMIN — CLEVIPIDINE 6 MG/HR: 0.5 EMULSION INTRAVENOUS at 04:01

## 2025-01-01 RX ADMIN — ESMOLOL HYDROCHLORIDE 110 MCG/KG/MIN: 20 INJECTION INTRAVENOUS at 12:01

## 2025-01-01 RX ADMIN — MUPIROCIN: 20 OINTMENT TOPICAL at 08:01

## 2025-01-01 NOTE — PLAN OF CARE
Problem: Occupational Therapy  Goal: Occupational Therapy Goal  Description: Goals to be met by 2/1/2025    Pt will complete grooming standing at sink with LRAD with independence.  Pt will complete functional mobility to/from toilet and toilet transfer with independence using LRAD.  Pt will complete self-feeding with independence.    Outcome: Progressing

## 2025-01-01 NOTE — PLAN OF CARE
Problem: Adult Inpatient Plan of Care  Goal: Plan of Care Review  Outcome: Progressing  Flowsheets (Taken 1/1/2025 0143)  Plan of Care Reviewed With: patient  Goal: Patient-Specific Goal (Individualized)  Outcome: Progressing  Flowsheets (Taken 1/1/2025 0143)  Individualized Care Needs: Q1 neuro checks, SBP between 130-150 frist 24H, NPO until speech evaluates  Anxieties, Fears or Concerns: having IV started  Patient/Family-Specific Goals (Include Timeframe): na  Goal: Absence of Hospital-Acquired Illness or Injury  Outcome: Progressing  Intervention: Identify and Manage Fall Risk  Flowsheets (Taken 1/1/2025 0143)  Safety Promotion/Fall Prevention:   assistive device/personal item within reach   side rails raised x 3   nonskid shoes/socks when out of bed   lighting adjusted   medications reviewed   /camera at bedside   pulse ox   room near unit station   supervised activity  Intervention: Prevent Skin Injury  Flowsheets (Taken 1/1/2025 0143)  Body Position:   position changed independently   weight shifting  Skin Protection: pulse oximeter probe site changed  Device Skin Pressure Protection: adhesive use limited  Intervention: Prevent and Manage VTE (Venous Thromboembolism) Risk  Flowsheets (Taken 1/1/2025 0143)  VTE Prevention/Management:   remove, assess skin, and reapply sequential compression device   bleeding precautions maintained   bleeding risk assessed   bleeding risk factor(s) identified, provider notified   ROM (passive) performed   ROM (active) performed  Intervention: Prevent Infection  Flowsheets (Taken 1/1/2025 0143)  Infection Prevention:   environmental surveillance performed   equipment surfaces disinfected   hand hygiene promoted   rest/sleep promoted   single patient room provided  Goal: Optimal Comfort and Wellbeing  Outcome: Progressing  Intervention: Monitor Pain and Promote Comfort  Flowsheets (Taken 1/1/2025 0143)  Pain Management Interventions:   care clustered    relaxation techniques promoted   quiet environment facilitated  Intervention: Provide Person-Centered Care  Flowsheets (Taken 1/1/2025 0143)  Trust Relationship/Rapport:   care explained   choices provided   emotional support provided   empathic listening provided   questions answered   questions encouraged   reassurance provided   thoughts/feelings acknowledged  Goal: Readiness for Transition of Care  Outcome: Progressing     Problem: Stroke, Intracerebral Hemorrhage  Goal: Optimal Coping  Outcome: Progressing  Intervention: Support Psychosocial Response to Stroke  Flowsheets (Taken 1/1/2025 0143)  Supportive Measures:   active listening utilized   decision-making supported   goal-setting facilitated   self-care encouraged   self-reflection promoted   relaxation techniques promoted   problem-solving facilitated   self-responsibility promoted   verbalization of feelings encouraged   positive reinforcement provided  Family/Support System Care: self-care encouraged  Goal: Effective Bowel Elimination  Outcome: Progressing  Intervention: Promote Effective Bowel Elimination  Flowsheets (Taken 1/1/2025 0143)  Bowel Elimination Management: relaxation techniques promoted  Goal: Optimal Cerebral Tissue Perfusion  Outcome: Progressing  Intervention: Protect and Optimize Cerebral Perfusion  Flowsheets (Taken 1/1/2025 0143)  Cerebral Perfusion Promotion: blood pressure monitored  Goal: Optimal Cognitive Function  Outcome: Progressing  Intervention: Optimize Cognitive Function  Flowsheets (Taken 1/1/2025 0143)  Environment Familiarity/Consistency:   daily routine followed   familiar objects from home provided  Goal: Effective Communication Skills  Outcome: Progressing  Goal: Optimal Functional Ability  Outcome: Progressing  Intervention: Optimize Functional Ability  Flowsheets (Taken 1/1/2025 0143)  Self-Care Promotion: independence encouraged  Activity Management:   Rolling - L1   Ankle pumps - L1   Arm raise - L1   Bridge -  L1   Heel slide - L1   Leg kicks - L2  Goal: Optimal Nutrition Intake  Outcome: Progressing  Intervention: Promote and Optimize Fluid and Food Intake  Flowsheets (Taken 1/1/2025 0143)  Nutrition Interventions: referred to dietitian  Goal: Optimal Pain Control and Function  Outcome: Progressing  Intervention: Monitor and Manage Pain  Flowsheets (Taken 1/1/2025 0143)  Sleep/Rest Enhancement:   awakenings minimized   noise level reduced   regular sleep/rest pattern promoted   relaxation techniques promoted   room darkened  Pain Management Interventions:   care clustered   relaxation techniques promoted   quiet environment facilitated  Goal: Effective Oxygenation and Ventilation  Outcome: Progressing  Intervention: Optimize Oxygenation and Ventilation  Flowsheets (Taken 1/1/2025 0143)  Airway/Ventilation Management:   airway patency maintained   calming measures promoted   humidification applied  Head of Bed (HOB) Positioning: HOB at 20-30 degrees  Goal: Improved Sensorimotor Function  Outcome: Progressing  Intervention: Optimize Range of Motion, Motor Control and Function  Flowsheets (Taken 1/1/2025 0143)  Positioning: Shoulder:   positioned on unaffected side   protection of affected arm encouraged   positioned shoulder flat when supine  Positioning/Transfer Devices:   in use   pillows  Range of Motion:   active ROM (range of motion) encouraged   ROM (range of motion) performed  Intervention: Optimize Sensory and Perceptual Ability  Flowsheets (Taken 1/1/2025 0143)  Pressure Reduction Techniques: frequent weight shift encouraged  Pressure Reduction Devices: alternating pressure pump (LINDA)  Goal: Safe and Effective Swallow  Outcome: Progressing  Intervention: Optimize Eating and Swallowing  Flowsheets (Taken 1/1/2025 0143)  Aspiration Precautions: NPO pending swallow screening/evaluation  Goal: Effective Urinary Elimination  Outcome: Progressing  Intervention: Promote Effective Bladder Elimination  Flowsheets (Taken  1/1/2025 0143)  Urinary Elimination Promotion: frequent voiding encouraged

## 2025-01-01 NOTE — PLAN OF CARE
Problem: Physical Therapy  Goal: Physical Therapy Goal  Description: Goals to be met by: 25     Patient will increase functional independence with mobility by performin. Sit to stand transfer with [place]  2. Bed to chair transfer with [place] using No Assistive Device  3. Gait  x 250 feet with [place] using No Assistive Device.   4. Ascend/descend 5 stair with bilateral Handrails [place] using No Assistive Device.     Outcome: Progressing

## 2025-01-01 NOTE — PT/OT/SLP EVAL
Occupational Therapy  Evaluation    Name: Ramon Pop  MRN: [id number]  Recent Surgery: * No surgery found *      Recommendations:     Discharge therapy intensity: Low Intensity Therapy (outpatient therapy)   Discharge Equipment Recommendations:  none  Barriers to discharge:  None    Assessment:     Ramon Pop is a 49 y.o. male with a medical diagnosis of Small left basal ganglia hematoma.  He presents with the following performance deficits affecting function: impaired coordination, gait instability, impaired functional mobility, impaired self care skills.     Pt tolerated eval well. Slightly impaired RUE coordination noted/dropping items during grooming. Would benefit from low/outpatient level therapy upon dc.     Rehab Prognosis: Good; patient would benefit from acute skilled OT services to address these deficits and reach maximum level of function.       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      Patient was found HOB elevated with blood pressure cuff, peripheral IV, pulse ox (continuous), telemetry upon OT entry to room.    General Precautions: Standard, fall, other (see comments) (SPB <140)  Orthopedic  Precautions: N/A  Braces: N/A    Vital Signs: Blood Pressure: 144/113; cleared by nursing for mobility  SpO2- 94%    Bed Mobility:    Patient completed Supine to Sit with supervision  Patient completed Sit to Supine with supervision    Functional Mobility/Transfers:  Patient completed Sit <> Stand Transfer with stand by assistance  with  rolling walker   Functional Mobility: Ambulated to/from bathroom with SBA-CGA overall and RW. No LOB noted    Activities of Daily Living:  Grooming: set up A overall for oral care; slight incoordination noted/dropping items      AMPAC 6 Click ADL:  AMPAC Total Score: 22    Functional Cognition:  Intact    Visual Perceptual Skills:  Intact    Upper Extremity Function:  Right Upper Extremity:   Strength WFL  Slightly impaired coordination/impaired finger-nose test  Sensation intact    Left Upper Extremity:  WFL  Pt is left hand dominant    Balance:   Intact    Therapeutic Positioning  Risk for acquired pressure injuries is decreased due to continence and ability to mobilize independently .    OT interventions performed during the course of today's session:   Education was provided on benefits of and recommendations for therapeutic positioning    Skin assessment: all bony prominences were assessed    Findings: no redness or breakdown noted    OT recommendations for therapeutic positioning throughout hospitalization:   Follow [facility] Pressure Injury Prevention Protocol    Patient Education:  Patient provided with verbal education education regarding OT role/goals/POC, fall prevention, safety awareness, and Discharge/DME recommendations.  Understanding was verbalized.     Patient left HOB elevated with all lines intact, call button in reach, and nursing notified.    GOALS:   Multidisciplinary Problems       Occupational Therapy Goals          Problem: Occupational Therapy    Goal Priority Disciplines Outcome Interventions   Occupational Therapy Goal     OT, PT/OT Progressing     Description: Goals to be met by 2/1/2025    Pt will complete grooming standing at sink with LRAD with independence.  Pt will complete functional mobility to/from toilet and toilet transfer with independence using LRAD.  Pt will complete self-feeding with independence.                         History:     No past medical history on file.    No past surgical history on file.    Time Tracking:     OT Date of Treatment:    OT Start Time: 0849  OT Stop Time: 0918  OT Total Time (min): 29 min    Billable Minutes:Evaluation moderate    1/1/2025

## 2025-01-01 NOTE — PT/OT/SLP EVAL
"Physical Therapy Evaluation    Patient Name:  Ramon Pop   MRN:  [id number]    Recommendations:     Discharge therapy intensity: Low Intensity Therapy   Discharge Equipment Recommendations: none   Barriers to discharge: Ongoing medical needs    Assessment:     Ramon Pop is a 49 y.o. male admitted with a medical diagnosis of L basal ganglia IPH, hypertensive emergency.  He presents with the following impairments/functional limitations: weakness, impaired endurance, impaired functional mobility, gait instability, impaired balance, decreased coordination . Pt's BLE is WFL, ambulated on unit with RW with CGA. Mild UE coordination deficits. Will f/u x 1-2 sessions to ensure safety with mobilizing without AD and step negotiation.    Rehab Prognosis: Good; patient would benefit from acute skilled PT services to address these deficits and reach maximum level of function.    Recent Surgery: * No surgery found *      Plan:     During this hospitalization, patient would benefit from acute PT services 6 x/week to address the identified rehab impairments via gait training, therapeutic activities, therapeutic exercises, neuromuscular re-education and progress toward the following goals:    Plan of Care Expires:  02/01/25    Subjective     Chief Complaint: "I've been better"  Patient/Family Comments/goals: home, PLOF  Pain/Comfort:  Pain Rating 1: 0/10    Patients cultural, spiritual, Sikhism conflicts given the current situation: no    Living Environment:  Pt lives with his girlfriend in a mobile home with 5 JOJO with BHR. Walk in shower no seat.  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  Equipment used at home: none.  DME owned (not currently used): none.  Upon discharge, patient will have assistance from girlfriend but she does work.    Objective:     Communicated with RN prior to session.  Patient found HOB elevated with blood pressure cuff, peripheral IV, pulse ox " (continuous), telemetry  upon PT entry to room.    General Precautions: Standard, fall (-150 first 24 hrs then <140)  Orthopedic Precautions:    Braces:    Respiratory Status: Room air  Blood Pressure: 155/103, RN adjusted IV meds and ok'd mobility      Exams:  Fine Motor Coordination:    -       Impaired  finger/thumb opposition delayed RUE; intact to rapid alternating PF/DF  Sensation:    -       Intact  light/touch BLE  RLE Strength: 5/5 gross  LLE Strength: 5/5 gross  Skin integrity: Visible skin intact      Functional Mobility:  Bed Mobility:     Supine to Sit: independence  Sit to Supine: independence  Transfers:     Sit to Stand:  stand by assistance with rolling walker  Gait: 1x10ft and [id number] with RW with SBA, slow steady pace  Balance: Dynamic standing balance at sink = SBA      AM-PAC 6 CLICK MOBILITY  Total Score:21       Treatment & Education:      Patient provided with verbal education education regarding PT role/goals/POC, fall prevention, safety awareness, and discharge/DME recommendations.  Understanding was verbalized.     Patient left HOB elevated with all lines intact, call button in reach, and RN notified.    GOALS:   Multidisciplinary Problems       Physical Therapy Goals          Problem: Physical Therapy    Goal Priority Disciplines Outcome Interventions   Physical Therapy Goal     PT, PT/OT Progressing    Description: Goals to be met by: 25     Patient will increase functional independence with mobility by performin. Sit to stand transfer with [place]  2. Bed to chair transfer with [place] using No Assistive Device  3. Gait  x 250 feet with [place] using No Assistive Device.   4. Ascend/descend 5 stair with bilateral Handrails [place] using No Assistive Device.                          History:     No past medical history on file.    No past surgical history on file.    Time Tracking:     PT Received On: 25  PT Start Time: 849     PT Stop Time:  0915  PT Total Time (min): 26 min     Billable Minutes: Evaluation MOD      01/01/2025

## 2025-01-01 NOTE — PT/OT/SLP EVAL
[facility]  Speech Language Pathology Department  Clinical Swallow Evaluation    Patient Name:  Ramon Pop   MRN:  [id number]    Recommendations     General recommendations:  SLP follow up x1 and Speech/Language and Cognitive Evaluation  Solid texture recommendation:  Regular Diet - IDDSI Level 7  Liquid consistency recommendation: Thin liquids - IDDSI Level 0   Medications: per patient preference  Swallow strategies/precautions: small bites/sips, slow rate, and upright for PO intake  Precautions: fall, aspiration    History     Ramon Pop is a 49 y.o. male admitted with a medical diagnosis of L basal ganglia IPH, hypertensive emergency.      No past medical history on file.  No past surgical history on file.    Home diet texture/consistency: Regular and thin liquids  Current method of nutrition: NPO    Subjective     Patient awake, alert, and cooperative.    Spiritual/Cultural/Evangelical Beliefs/Practices that affect care: no    Pain/Comfort: Pain Rating 1: 0/10    Objective     ORAL MUSCULATURE  Dentition: own teeth  Secretion Management: adequate  Mucosal Quality: good  Facial Movement: reduced lower  Buccal Strength & Mobility: impaired  Mandibular Strength & Mobility: WFL  Oral Labial Strength & Mobility: impaired retraction  Lingual Strength & Mobility: WFL  Vocal Quality: adequate    PO TRIALS  Consistency Fed By Oral Symptoms Pharyngeal Symptoms   Thin liquid by straw Self None None   Puree Self None None   Chewable solid Self None None     Assessment     No signs/sx of aspiration. Initiate PO diet. ST to follow up as appropriate.    Outcome Measures     Functional Oral Intake Scale: 7 - Total oral diet with no restrictions    Goals     Multidisciplinary Problems       SLP Goals       Not on file                  Education     Patient provided with verbal education regarding ST POC.  Understanding was verbalized.    Plan     SLP Follow-Up:  Yes   Patient to be seen:  5 x/week    Plan of Care expires:  01/15/25  Plan of Care reviewed with:  patient     Time Tracking     SLP Treatment Date:   01/01/25  Speech Start Time:  1030  Speech Stop Time:  1045     Speech Total Time (min):  15 min    Billable minutes:  Swallow and Oral Function Evaluation, 15 minutes     01/01/2025

## 2025-01-01 NOTE — PROGRESS NOTES
[facility] - Emergency Dept  Pulmonary Critical Care Note    Patient Name: Ramon Pop  MRN: [id number]  Admission Date: 12/31/2024  Hospital Length of Stay: 1 days  Code Status: No Order  Attending Provider: Jean Pierre Colon MD  Primary Care Provider: Elen, Primary Doctor     Subjective:     HPI:   Ramon Pop is a 49 y.o. male with no significant PMH who presented to the ED on 12/31/2024 with CC of stroke like symptoms. Patient states onset was this morning when he noticed his facial drooping with slurred speech and blurry vision. He also noticed weakness of right UE and LE. He states he could walk but feeling off-balance. He denies health issues and has no primary care physician. He admits smoking since 13 y.o. and been smoking 2 ppd x 30 years. He adds heavily drinking alcohol for 10 years and cutting down to 0.5-1 pint per month for past 3 years - last intake 2 weeks ago. He uses marijuana regularly. Endorses cocaine use-last intake 2 weeks ago. He denies HA, dizziness, CP, SOB, abdominal pain, urinary difficulties, constipation/diarrhea, and fall.     In ED, upon arrival /155. Other V/S stable. Given labetalol IV 10 mg and started on clevidipine.  CTH showed left basal ganglial hemorrhage without evidence of aneurysm or large vessel occlusion. Neurology and neurosurgery consulted. Critical care medicine consulted for admission to ICU for BP management and Q1H neurocheck.       Hospital Course/Significant events:  12/31/2024 - Admitted to ICU       24 Hour Interval History:  No acute events overnight.  No new neurologic deficits.  Patient reports improvements in right lower extremity paresis, right upper extremity paresis largely stable.  Ongoing right facial droop, failed swallow study yesterday.        Review of systems negative unless documented in the history of present illness.        No past medical history on file.    No past surgical history on file.    Social History      Socioeconomic History    Marital status: Significant Other     Social Drivers of Health     Financial Resource Strain: Patient Declined (12/31/2024)    Overall Financial Resource Strain (CARDIA)     Difficulty of Paying Living Expenses: Patient declined   Food Insecurity: Patient Declined (12/31/2024)    Hunger Vital Sign     Worried About Running Out of Food in the Last Year: Patient declined     Ran Out of Food in the Last Year: Patient declined   Transportation Needs: Patient Declined (12/31/2024)    TRANSPORTATION NEEDS     Transportation : Patient declined   Stress: Patient Declined (12/31/2024)    [place] [employer] - Occupational Stress Questionnaire     Feeling of Stress : Patient declined   Housing Stability: Patient Declined (12/31/2024)    Housing Stability Vital Sign     Unable to Pay for Housing in the Last Year: Patient declined     Homeless in the Last Year: Patient declined       No current outpatient medications  Current Inpatient Medications   carvediloL  6.25 mg Oral BID    levETIRAcetam (Keppra) IV (PEDS and ADULTS)  500 mg Intravenous Q12H    losartan  50 mg Oral Daily    mupirocin   Nasal BID    nicotine  1 patch Transdermal Daily     Current Intravenous Infusions   clevidipine  0-32 mg/hr Intravenous Continuous 16 mL/hr at 01/01/25 0711 8 mg/hr at 01/01/25 0711    esmolol  0-300 mcg/kg/min Intravenous Continuous   Stopped at 01/01/25 0106       Objective:     Intake/Output Summary (Last 24 hours) at 1/1/2025 1027  Last data filed at 1/1/2025 0711  Gross per 24 hour   Intake 749.35 ml   Output 1650 ml   Net -900.65 ml     Vital Signs (Most Recent):  Temp: 98 °F (36.7 °C) (01/01/25 0750)  Pulse: 74 (01/01/25 0750)  Resp: 17 (01/01/25 0750)  BP: (!) 141/94 (01/01/25 0750)  SpO2: (!) 93 % (01/01/25 0750)  Body mass index is 31.12 kg/m².  Weight: 99.8 kg (220 lb 0.3 oz) Vital Signs (24h Range):  Temp:  [97.9 °F (36.6 °C)-98.2 °F (36.8 °C)] 98 °F (36.7 °C)  Pulse:  [59-97]  74  Resp:  [11-33] 17  SpO2:  [92 %-98 %] 93 %  BP: ()/() 141/94         Physical Exam:  Gen- A/O, NAD  HENT- ATNC, right facial droop  CV- RRR  Resp- CTAB, normal work of breathing   MSK- WWP, no edema  Neuro- 5/5 strength in BLE, 4/5 RUE strength, mild right facial droop   Psych- appropriate mood and affect         Lines/Drains/Airways       Peripheral Intravenous Line  Duration                  Peripheral IV - Single Lumen 12/31/24 1042 18 G Anterior;Right Forearm <1 day         Peripheral IV - Single Lumen 12/31/24 2000 20 G Anterior;Left Forearm <1 day                  Significant Labs:  Lab Results   Component Value Date    WBC 7.78 01/01/2025    HGB 16.3 01/01/2025    HCT 49.4 01/01/2025    MCV 93.9 01/01/2025     01/01/2025       BMP  Lab Results   Component Value Date     01/01/2025    K 4.4 01/01/2025    CO2 19 (L) 01/01/2025    BUN 10.6 01/01/2025    CREATININE 1.25 01/01/2025    CALCIUM 9.0 01/01/2025    AGAP 13.0 01/01/2025    EGFRNONAA >60 09/21/2017         Assessment/Plan:     Assessment  Left basal ganglia intraparenchymal hemorrhage  Hypertensive emergency  Tobacco dependence  Polysubstance use     Plan  Repeat CT head yesterday evening with largely stable small left basal ganglia intracranial hemorrhage, no new neurologic deficits overnight  Continue Keppra for secondary seizure prophylaxis, hold anticoagulants and antiplatelets  Significant anxiety this morning, trial hydroxyzine  Neurosurgery evaluated with no surgical interventions recommended  Remains on Cleviprex for control of hypertension  Failed swallow evaluation, defer increasing oral antihypertensives pending speech evaluation  PT/OT to evaluate today  Remain in ICU while on vasoactive medications        DVT Prophylaxis: SCDs, hold chemical anticoagulation due to intracranial hemorrhage  GI Prophylaxis: None         I spent 32 minutes providing critical care services to this patient.  This does not include time  spent for separately billed procedures.         Grant Hills MD   Pulmonary Critical Care Medicine  [facility]

## 2025-01-02 LAB
ALBUMIN SERPL-MCNC: 3.8 G/DL (ref 3.5–5)
ALBUMIN/GLOB SERPL: 1.3 RATIO (ref 1.1–2)
ALP SERPL-CCNC: 58 UNIT/L (ref 40–150)
ALT SERPL-CCNC: 25 UNIT/L (ref 0–55)
ANION GAP SERPL CALC-SCNC: 9 MEQ/L
AST SERPL-CCNC: 13 UNIT/L (ref 5–34)
BASOPHILS # BLD AUTO: 0.03 X10(3)/MCL
BASOPHILS NFR BLD AUTO: 0.3 %
BILIRUB SERPL-MCNC: 0.5 MG/DL
BUN SERPL-MCNC: 13.9 MG/DL (ref 8.9–20.6)
CALCIUM SERPL-MCNC: 8.6 MG/DL (ref 8.4–10.2)
CHLORIDE SERPL-SCNC: 109 MMOL/L (ref 98–107)
CO2 SERPL-SCNC: 20 MMOL/L (ref 22–29)
CREAT SERPL-MCNC: 1.36 MG/DL (ref 0.72–1.25)
CREAT/UREA NIT SERPL: 10
EOSINOPHIL # BLD AUTO: 0.23 X10(3)/MCL (ref 0–0.9)
EOSINOPHIL NFR BLD AUTO: 2.3 %
ERYTHROCYTE [DISTWIDTH] IN BLOOD BY AUTOMATED COUNT: 14.6 % (ref 11.5–17)
GFR SERPLBLD CREATININE-BSD FMLA CKD-EPI: >60 ML/MIN/1.73/M2
GLOBULIN SER-MCNC: 2.9 GM/DL (ref 2.4–3.5)
GLUCOSE SERPL-MCNC: 118 MG/DL (ref 74–100)
HCT VFR BLD AUTO: 50.6 % (ref 42–52)
HGB BLD-MCNC: 16.6 G/DL (ref 14–18)
IMM GRANULOCYTES # BLD AUTO: 0.03 X10(3)/MCL (ref 0–0.04)
IMM GRANULOCYTES NFR BLD AUTO: 0.3 %
LYMPHOCYTES # BLD AUTO: 2.38 X10(3)/MCL (ref 0.6–4.6)
LYMPHOCYTES NFR BLD AUTO: 24.1 %
MAGNESIUM SERPL-MCNC: 2.3 MG/DL (ref 1.6–2.6)
MCH RBC QN AUTO: 30.8 PG (ref 27–31)
MCHC RBC AUTO-ENTMCNC: 32.8 G/DL (ref 33–36)
MCV RBC AUTO: 93.9 FL (ref 80–94)
MONOCYTES # BLD AUTO: 0.7 X10(3)/MCL (ref 0.1–1.3)
MONOCYTES NFR BLD AUTO: 7.1 %
NEUTROPHILS # BLD AUTO: 6.49 X10(3)/MCL (ref 2.1–9.2)
NEUTROPHILS NFR BLD AUTO: 65.9 %
NRBC BLD AUTO-RTO: 0 %
PHOSPHATE SERPL-MCNC: 3.5 MG/DL (ref 2.3–4.7)
PLATELET # BLD AUTO: 207 X10(3)/MCL (ref 130–400)
PMV BLD AUTO: 11 FL (ref 7.4–10.4)
POTASSIUM SERPL-SCNC: 4.2 MMOL/L (ref 3.5–5.1)
PROT SERPL-MCNC: 6.7 GM/DL (ref 6.4–8.3)
RBC # BLD AUTO: 5.39 X10(6)/MCL (ref 4.7–6.1)
SODIUM SERPL-SCNC: 138 MMOL/L (ref 136–145)
WBC # BLD AUTO: 9.86 X10(3)/MCL (ref 4.5–11.5)

## 2025-01-02 PROCEDURE — S4991 NICOTINE PATCH NONLEGEND: HCPCS

## 2025-01-02 PROCEDURE — 25000003 PHARM REV CODE 250

## 2025-01-02 PROCEDURE — 80053 COMPREHEN METABOLIC PANEL: CPT

## 2025-01-02 PROCEDURE — 63600175 PHARM REV CODE 636 W HCPCS

## 2025-01-02 PROCEDURE — 36415 COLL VENOUS BLD VENIPUNCTURE: CPT

## 2025-01-02 PROCEDURE — 97116 GAIT TRAINING THERAPY: CPT

## 2025-01-02 PROCEDURE — 63600175 PHARM REV CODE 636 W HCPCS: Performed by: NURSE PRACTITIONER

## 2025-01-02 PROCEDURE — C9248 INJ, CLEVIDIPINE BUTYRATE: HCPCS

## 2025-01-02 PROCEDURE — 84100 ASSAY OF PHOSPHORUS: CPT

## 2025-01-02 PROCEDURE — 83735 ASSAY OF MAGNESIUM: CPT

## 2025-01-02 PROCEDURE — 25000003 PHARM REV CODE 250: Performed by: NURSE PRACTITIONER

## 2025-01-02 PROCEDURE — 20000000 HC ICU ROOM

## 2025-01-02 PROCEDURE — C9248 INJ, CLEVIDIPINE BUTYRATE: HCPCS | Mod: TB

## 2025-01-02 PROCEDURE — 25000003 PHARM REV CODE 250: Performed by: STUDENT IN AN ORGANIZED HEALTH CARE EDUCATION/TRAINING PROGRAM

## 2025-01-02 PROCEDURE — 63600175 PHARM REV CODE 636 W HCPCS: Mod: TB

## 2025-01-02 PROCEDURE — 25000003 PHARM REV CODE 250: Performed by: INTERNAL MEDICINE

## 2025-01-02 PROCEDURE — 85025 COMPLETE CBC W/AUTO DIFF WBC: CPT

## 2025-01-02 RX ORDER — NIFEDIPINE 30 MG/1
30 TABLET, EXTENDED RELEASE ORAL DAILY
Status: DISCONTINUED | OUTPATIENT
Start: 2025-01-02 | End: 2025-01-05

## 2025-01-02 RX ORDER — LOSARTAN POTASSIUM 50 MG/1
100 TABLET ORAL DAILY
Status: DISCONTINUED | OUTPATIENT
Start: 2025-01-03 | End: 2025-01-06 | Stop reason: HOSPADM

## 2025-01-02 RX ORDER — LOSARTAN POTASSIUM 50 MG/1
50 TABLET ORAL ONCE
Status: COMPLETED | OUTPATIENT
Start: 2025-01-02 | End: 2025-01-02

## 2025-01-02 RX ADMIN — LABETALOL HYDROCHLORIDE 10 MG: 5 INJECTION, SOLUTION INTRAVENOUS at 10:01

## 2025-01-02 RX ADMIN — CLEVIPIDINE 10 MG/HR: 0.5 EMULSION INTRAVENOUS at 11:01

## 2025-01-02 RX ADMIN — CLEVIPIDINE 4 MG/HR: 0.5 EMULSION INTRAVENOUS at 11:01

## 2025-01-02 RX ADMIN — MUPIROCIN: 20 OINTMENT TOPICAL at 09:01

## 2025-01-02 RX ADMIN — CARVEDILOL 6.25 MG: 3.12 TABLET, FILM COATED ORAL at 09:01

## 2025-01-02 RX ADMIN — MUPIROCIN: 20 OINTMENT TOPICAL at 08:01

## 2025-01-02 RX ADMIN — LEVETIRACETAM 500 MG: 100 INJECTION, SOLUTION INTRAVENOUS at 09:01

## 2025-01-02 RX ADMIN — CLEVIPIDINE 7 MG/HR: 0.5 EMULSION INTRAVENOUS at 05:01

## 2025-01-02 RX ADMIN — NIFEDIPINE 30 MG: 30 TABLET, FILM COATED, EXTENDED RELEASE ORAL at 11:01

## 2025-01-02 RX ADMIN — NICOTINE 1 PATCH: 21 PATCH, EXTENDED RELEASE TRANSDERMAL at 08:01

## 2025-01-02 RX ADMIN — CLEVIPIDINE 10 MG/HR: 0.5 EMULSION INTRAVENOUS at 08:01

## 2025-01-02 RX ADMIN — LEVETIRACETAM 500 MG: 100 INJECTION, SOLUTION INTRAVENOUS at 08:01

## 2025-01-02 RX ADMIN — CLEVIPIDINE 10 MG/HR: 0.5 EMULSION INTRAVENOUS at 01:01

## 2025-01-02 RX ADMIN — LABETALOL HYDROCHLORIDE 10 MG: 5 INJECTION, SOLUTION INTRAVENOUS at 05:01

## 2025-01-02 RX ADMIN — LOSARTAN POTASSIUM 50 MG: 50 TABLET, FILM COATED ORAL at 11:01

## 2025-01-02 RX ADMIN — CLEVIPIDINE 7 MG/HR: 0.5 EMULSION INTRAVENOUS at 01:01

## 2025-01-02 RX ADMIN — LOSARTAN POTASSIUM 50 MG: 50 TABLET, FILM COATED ORAL at 08:01

## 2025-01-02 RX ADMIN — CLEVIPIDINE 5 MG/HR: 0.5 EMULSION INTRAVENOUS at 04:01

## 2025-01-02 RX ADMIN — CLEVIPIDINE 6 MG/HR: 0.5 EMULSION INTRAVENOUS at 09:01

## 2025-01-02 RX ADMIN — CARVEDILOL 6.25 MG: 3.12 TABLET, FILM COATED ORAL at 08:01

## 2025-01-02 NOTE — PLAN OF CARE
Problem: Physical Therapy  Goal: Physical Therapy Goal  Description: Goals to be met by: 25     Patient will increase functional independence with mobility by performin. Sit to stand transfer with [place]  2. Bed to chair transfer with [place] using No Assistive Device  3. Gait  x 250 feet with [place] using No Assistive Device.   4. Ascend/descend 5 stair with bilateral Handrails [place] using No Assistive Device.     Outcome: Met

## 2025-01-02 NOTE — PLAN OF CARE
Problem: Adult Inpatient Plan of Care  Goal: Plan of Care Review  Outcome: Progressing  Goal: Patient-Specific Goal (Individualized)  Outcome: Progressing  Goal: Absence of Hospital-Acquired Illness or Injury  Outcome: Progressing  Goal: Optimal Comfort and Wellbeing  Outcome: Progressing  Goal: Readiness for Transition of Care  Outcome: Progressing     Problem: Stroke, Intracerebral Hemorrhage  Goal: Optimal Coping  Outcome: Progressing  Goal: Effective Bowel Elimination  Outcome: Progressing  Goal: Optimal Cerebral Tissue Perfusion  Outcome: Progressing  Goal: Optimal Cognitive Function  Outcome: Progressing  Goal: Effective Communication Skills  Outcome: Progressing  Goal: Optimal Functional Ability  Outcome: Progressing  Goal: Optimal Nutrition Intake  Outcome: Progressing  Goal: Optimal Pain Control and Function  Outcome: Progressing  Goal: Effective Oxygenation and Ventilation  Outcome: Progressing  Goal: Improved Sensorimotor Function  Outcome: Progressing  Goal: Safe and Effective Swallow  Outcome: Progressing  Goal: Effective Urinary Elimination  Outcome: Progressing     Problem: Fall Injury Risk  Goal: Absence of Fall and Fall-Related Injury  Reactivated

## 2025-01-02 NOTE — PROGRESS NOTES
[facility] - Emergency Dept  Pulmonary Critical Care Note    Patient Name: Ramon Pop  MRN: [id number]  Admission Date: 12/31/2024  Hospital Length of Stay: 2 days  Code Status: No Order  Attending Provider: Grant Hills MD  Primary Care Provider: Elen, Primary Doctor     Subjective:     HPI:   Ramon Pop is a 49 y.o. male with no significant PMH who presented to the ED on 12/31/2024 with CC of stroke like symptoms. Patient states onset was this morning when he noticed his facial drooping with slurred speech and blurry vision. He also noticed weakness of right UE and LE. He states he could walk but feeling off-balance. He denies health issues and has no primary care physician. He admits smoking since 13 y.o. and been smoking 2 ppd x 30 years. He adds heavily drinking alcohol for 10 years and cutting down to 0.5-1 pint per month for past 3 years - last intake 2 weeks ago. He uses marijuana regularly. Endorses cocaine use-last intake 2 weeks ago. He denies HA, dizziness, CP, SOB, abdominal pain, urinary difficulties, constipation/diarrhea, and fall.     In ED, upon arrival /155. Other V/S stable. Given labetalol IV 10 mg and started on clevidipine.  CTH showed left basal ganglial hemorrhage without evidence of aneurysm or large vessel occlusion. Neurology and neurosurgery consulted. Critical care medicine consulted for admission to ICU for BP management and Q1H neurocheck.       Hospital Course/Significant events:  12/31/2024 - Admitted to ICU       24 Hour Interval History:  No acute events overnight.  No new neurologic deficits, improvement in weakness noted since admission.  Patient ambulatory with physical therapy around ICU today.  Remains on Cleviprex for blood pressure control.  Cleared by speech therapy for oral intake.        Review of systems negative unless documented in the history of present illness.        No past medical history on file.    No past surgical history on  file.    Social History     Socioeconomic History    Marital status: Significant Other     Social Drivers of Health     Financial Resource Strain: Patient Declined (12/31/2024)    Overall Financial Resource Strain (CARDIA)     Difficulty of Paying Living Expenses: Patient declined   Food Insecurity: Patient Declined (12/31/2024)    Hunger Vital Sign     Worried About Running Out of Food in the Last Year: Patient declined     Ran Out of Food in the Last Year: Patient declined   Transportation Needs: Patient Declined (12/31/2024)    TRANSPORTATION NEEDS     Transportation : Patient declined   Stress: Patient Declined (12/31/2024)    [place] [place] [employer] - Occupational Stress Questionnaire     Feeling of Stress : Patient declined   Housing Stability: Patient Declined (12/31/2024)    Housing Stability Vital Sign     Unable to Pay for Housing in the Last Year: Patient declined     Homeless in the Last Year: Patient declined       No current outpatient medications  Current Inpatient Medications   carvediloL  6.25 mg Oral BID    levETIRAcetam (Keppra) IV (PEDS and ADULTS)  500 mg Intravenous Q12H    losartan  50 mg Oral Daily    mupirocin   Nasal BID    nicotine  1 patch Transdermal Daily     Current Intravenous Infusions   clevidipine  0-32 mg/hr Intravenous Continuous 20 mL/hr at 01/02/25 0817 10 mg/hr at 01/02/25 0817    esmolol  0-300 mcg/kg/min Intravenous Continuous   Stopped at 01/01/25 1152       Objective:     Intake/Output Summary (Last 24 hours) at 1/2/2025 1106  Last data filed at 1/2/2025 0610  Gross per 24 hour   Intake 1217.17 ml   Output 1200 ml   Net 17.17 ml     Vital Signs (Most Recent):  Temp: 97.8 °F (36.6 °C) (01/02/25 0800)  Pulse: 74 (01/02/25 1106)  Resp: (!) 21 (01/02/25 1106)  BP: (!) 137/99 (01/02/25 1106)  SpO2: 95 % (01/02/25 1106)  Body mass index is 31.12 kg/m².  Weight: 99.8 kg (220 lb 0.3 oz) Vital Signs (24h Range):  Temp:  [97.6 °F (36.4 °C)-98.4 °F (36.9 °C)] 97.8 °F  (36.6 °C)  Pulse:  [62-99] 74  Resp:  [14-33] 21  SpO2:  [90 %-98 %] 95 %  BP: (109-176)/() 137/99         Physical Exam:  Gen- A/O, NAD  HENT- ATNC, right facial droop  CV- RRR  Resp- CTAB, normal work of breathing   MSK- WWP, no edema  Neuro- 5/5 strength in BLE, 4+/5 RUE strength, mild right facial droop   Psych- appropriate mood and affect         Lines/Drains/Airways       Peripheral Intravenous Line  Duration                  Peripheral IV - Single Lumen 01/01/25 1135 20 G Anterior;Proximal;Right Forearm <1 day         Peripheral IV - Single Lumen 01/02/25 0800 20 G Anterior;Left Hand <1 day                  Significant Labs:  Lab Results   Component Value Date    WBC 9.86 01/02/2025    HGB 16.6 01/02/2025    HCT 50.6 01/02/2025    MCV 93.9 01/02/2025     01/02/2025       BMP  Lab Results   Component Value Date     01/02/2025    K 4.2 01/02/2025    CO2 20 (L) 01/02/2025    BUN 13.9 01/02/2025    CREATININE 1.36 (H) 01/02/2025    CALCIUM 8.6 01/02/2025    AGAP 9.0 01/02/2025    EGFRNONAA >60 09/21/2017         Assessment/Plan:     Assessment  Left basal ganglia intraparenchymal hemorrhage  Hypertensive emergency  Tobacco dependence  Polysubstance use     Plan  No new neurologic deficits, improving symptoms since ICU admission  Continue Keppra for secondary seizure prophylaxis, hold anticoagulants and antiplatelets  Ambulatory with the assistance of PT, significant improvement in lower extremity weakness since admission  Neurosurgery evaluated with no surgical interventions recommended  Remains on Cleviprex for control of hypertension  Increase oral antihypertensive regimen today  Remain in ICU while on vasoactive medications        DVT Prophylaxis: SCDs, hold chemical anticoagulation due to intracranial hemorrhage  GI Prophylaxis: None         I spent 32 minutes providing critical care services to this patient.  This does not include time spent for separately billed procedures.          Grant Hills MD   Pulmonary Critical Care Medicine  [facility]

## 2025-01-02 NOTE — PLAN OF CARE
01/02/25 1121   Discharge Assessment   Assessment Type Discharge Planning Assessment   Confirmed/corrected address, phone number and insurance Yes   Confirmed Demographics Correct on Facesheet  (physical address: [address] [place] [address])   Source of Information patient   Communicated CANDELARIA with patient/caregiver Date not available/Unable to determine   Reason For Admission Basal Ganglia Hemorrhage   People in Home significant other   Facility Arrived From: home   Do you expect to return to your current living situation? Yes   Do you have help at home or someone to help you manage your care at home? Yes   Who are your caregiver(s) and their phone number(s)? [email or web address] contreras   Prior to hospitilization cognitive status: Alert/Oriented   Current cognitive status: Alert/Oriented   Walking or Climbing Stairs Difficulty no   Dressing/Bathing Difficulty no   Home Accessibility stairs to enter home   Number of Stairs, Main Entrance three   Stair Railings, Main Entrance none   Equipment Currently Used at Home none   Patient currently being followed by outpatient case management? No   Do you currently have service(s) that help you manage your care at home? No   Do you take prescription medications? No   Do you have prescription coverage? No   Who is going to help you get home at discharge? girlfriend   How do you get to doctors appointments? car, drives self   Are you on dialysis? No   Do you take coumadin? No   Discharge Plan A Other  (TBD)   Discharge Plan B Other  (TBD)   DME Needed Upon Discharge  other (see comments)  (TBD)   Discharge Plan discussed with: Patient   Transition of Care Barriers Unisured   OTHER   Name(s) of People in Home savannahfrienMeseret contreras     Lives with girlfriend who works  No PCP.  No DME.  Does not work. Has no children and one sibling, Roxy Sanchez who is homeless, schizophrenic according to patient.

## 2025-01-02 NOTE — PT/OT/SLP PROGRESS
Physical Therapy Treatment  And Discharge    Patient Name:  Ramon Pop   MRN:  [id number]    Recommendations:     Discharge therapy intensity: No Therapy Indicated   Discharge Equipment Recommendations: none  Barriers to discharge:  medical dx    Assessment:     Ramon Pop is a 49 y.o. male admitted with a medical diagnosis of L basal ganglia IPH; HTN emergency.      Pt ambulates today about 370 ft without use of AD; no LOB or major safety concerns  Pt also able to step up/down on a 6'' step without having to hold on to anything; appears safe and strong enough to navigate home environment    Pt with great improvement since admission. Mobilizing well enough therefore PT signing off a this time.       Plan:     Pt no longer requiring acute PT services. PT to sign off. Please re-consult as necessary.     Subjective     Chief Complaint: n/a  Patient/Family Comments/goals: to go home  Pain/Comfort:  Pain Rating 1: 0/10      Objective:     Communicated with NSG prior to session.  Patient found HOB elevated with blood pressure cuff, pulse ox (continuous), peripheral IV, telemetry upon PT entry to room.     General Precautions: Standard, fall (SBP<140)  Orthopedic Precautions: N/A  Braces: N/A  Respiratory Status: Room air  Blood Pressure: 158/103; RN aware, on IV drip for BP control  Skin Integrity: Visible skin intact      Functional Mobility:  Bed Mobility:     Supine to Sit: independence  Transfers:     Sit to Stand:  independence with no AD  Gait: pt ambulates approx 370 ft without use of AD; SBA for PT to push IV pole but otherwise pt independent; pt demo a steady, step through pattern     Therapeutic Activities/Exercises:  Step up/down: x5 reps performed with a 6'' box; pt not requiring UE assist; steady     Education:  Patient provided with verbal education regarding PT role/goals/POC, safety awareness, and discharge/DME recommendations.  Understanding was verbalized.     Patient left up in chair with all lines  intact, call button in reach, and RN notified    GOALS:   Multidisciplinary Problems       Physical Therapy Goals       Not on file              Multidisciplinary Problems (Resolved)          Problem: Physical Therapy    Goal Priority Disciplines Outcome Interventions   Physical Therapy Goal   (Resolved)     PT, PT/OT Met    Description: Goals to be met by: 25     Patient will increase functional independence with mobility by performin. Sit to stand transfer with [place]  2. Bed to chair transfer with [place] using No Assistive Device  3. Gait  x 250 feet with [place] using No Assistive Device.   4. Ascend/descend 5 stair with bilateral Handrails [place] using No Assistive Device.                          Time Tracking:     PT Received On: 25  PT Start Time: 933     PT Stop Time: 956  PT Total Time (min): 23 min     Billable Minutes: Gait Training 2    Treatment Type: Treatment  PT/PTA: PT     Number of PTA visits since last PT visit: 2025

## 2025-01-02 NOTE — PT/OT/SLP PROGRESS
[facility]  Speech Language Pathology Department  Diet Tolerance Follow-up    Patient Name:  Ramon Pop   MRN:  [id number]    Recommendations     General recommendations:  Speech/Language and Cognitive Evaluation  Solid texture recommendation:  Regular Diet - IDDSI Level 7  Liquid consistency recommendation: Thin liquids - IDDSI Level 0   Medications: per patient preference  Swallow strategies/precautions: small bites/sips, slow rate, and upright for PO intake  Precautions: fall, aspiration    Diet Tolerance     Nursing reports no difficulty regarding diet tolerance.    Outcome Measures     Functional Oral Intake Scale: 7 - Total oral diet with no restrictions    Plan     SLP Follow-Up: No    Time Tracking     SLP Treatment Date: 01/02/2025

## 2025-01-03 LAB
ALBUMIN SERPL-MCNC: 3.7 G/DL (ref 3.5–5)
ALBUMIN/GLOB SERPL: 1.3 RATIO (ref 1.1–2)
ALP SERPL-CCNC: 61 UNIT/L (ref 40–150)
ALT SERPL-CCNC: 24 UNIT/L (ref 0–55)
ANION GAP SERPL CALC-SCNC: 12 MMOL/L (ref 8–16)
ANION GAP SERPL CALC-SCNC: 9 MEQ/L
AST SERPL-CCNC: 10 UNIT/L (ref 5–34)
BASOPHILS # BLD AUTO: 0.04 X10(3)/MCL
BASOPHILS NFR BLD AUTO: 0.4 %
BILIRUB SERPL-MCNC: 0.7 MG/DL
BUN SERPL-MCNC: 16.8 MG/DL (ref 8.9–20.6)
BUN SERPL-MCNC: 27 MG/DL (ref 6–30)
CALCIUM SERPL-MCNC: 8.6 MG/DL (ref 8.4–10.2)
CHLORIDE SERPL-SCNC: 104 MMOL/L (ref 95–110)
CHLORIDE SERPL-SCNC: 111 MMOL/L (ref 98–107)
CO2 SERPL-SCNC: 19 MMOL/L (ref 22–29)
CREAT SERPL-MCNC: 1.3 MG/DL (ref 0.5–1.4)
CREAT SERPL-MCNC: 1.32 MG/DL (ref 0.72–1.25)
CREAT/UREA NIT SERPL: 13
EOSINOPHIL # BLD AUTO: 0.29 X10(3)/MCL (ref 0–0.9)
EOSINOPHIL NFR BLD AUTO: 2.9 %
ERYTHROCYTE [DISTWIDTH] IN BLOOD BY AUTOMATED COUNT: 14.3 % (ref 11.5–17)
GFR SERPLBLD CREATININE-BSD FMLA CKD-EPI: >60 ML/MIN/1.73/M2
GLOBULIN SER-MCNC: 2.8 GM/DL (ref 2.4–3.5)
GLUCOSE SERPL-MCNC: 109 MG/DL (ref 74–100)
GLUCOSE SERPL-MCNC: 123 MG/DL (ref 70–110)
HCT VFR BLD AUTO: 48.9 % (ref 42–52)
HCT VFR BLD CALC: 51 %PCV (ref 36–54)
HGB BLD-MCNC: 16.4 G/DL (ref 14–18)
HGB BLD-MCNC: 17 G/DL
IMM GRANULOCYTES # BLD AUTO: 0.03 X10(3)/MCL (ref 0–0.04)
IMM GRANULOCYTES NFR BLD AUTO: 0.3 %
LYMPHOCYTES # BLD AUTO: 2.26 X10(3)/MCL (ref 0.6–4.6)
LYMPHOCYTES NFR BLD AUTO: 22.3 %
MAGNESIUM SERPL-MCNC: 2.2 MG/DL (ref 1.6–2.6)
MCH RBC QN AUTO: 30.9 PG (ref 27–31)
MCHC RBC AUTO-ENTMCNC: 33.5 G/DL (ref 33–36)
MCV RBC AUTO: 92.1 FL (ref 80–94)
MONOCYTES # BLD AUTO: 0.73 X10(3)/MCL (ref 0.1–1.3)
MONOCYTES NFR BLD AUTO: 7.2 %
NEUTROPHILS # BLD AUTO: 6.78 X10(3)/MCL (ref 2.1–9.2)
NEUTROPHILS NFR BLD AUTO: 66.9 %
NRBC BLD AUTO-RTO: 0 %
PHOSPHATE SERPL-MCNC: 3.7 MG/DL (ref 2.3–4.7)
PLATELET # BLD AUTO: 171 X10(3)/MCL (ref 130–400)
PMV BLD AUTO: 10.8 FL (ref 7.4–10.4)
POC IONIZED CALCIUM: 1.13 MMOL/L (ref 1.06–1.42)
POC PTINR: 1.1 (ref 0.9–1.2)
POC TCO2 (MEASURED): 28 MMOL/L (ref 23–29)
POTASSIUM BLD-SCNC: 5.1 MMOL/L (ref 3.5–5.1)
POTASSIUM SERPL-SCNC: 4.1 MMOL/L (ref 3.5–5.1)
PROT SERPL-MCNC: 6.5 GM/DL (ref 6.4–8.3)
RBC # BLD AUTO: 5.31 X10(6)/MCL (ref 4.7–6.1)
SAMPLE: ABNORMAL
SAMPLE: NORMAL
SODIUM BLD-SCNC: 139 MMOL/L (ref 136–145)
SODIUM SERPL-SCNC: 139 MMOL/L (ref 136–145)
WBC # BLD AUTO: 10.13 X10(3)/MCL (ref 4.5–11.5)

## 2025-01-03 PROCEDURE — 63600175 PHARM REV CODE 636 W HCPCS

## 2025-01-03 PROCEDURE — 25000003 PHARM REV CODE 250: Performed by: NURSE PRACTITIONER

## 2025-01-03 PROCEDURE — S4991 NICOTINE PATCH NONLEGEND: HCPCS

## 2025-01-03 PROCEDURE — 84100 ASSAY OF PHOSPHORUS: CPT

## 2025-01-03 PROCEDURE — 63600175 PHARM REV CODE 636 W HCPCS: Performed by: NURSE PRACTITIONER

## 2025-01-03 PROCEDURE — C9248 INJ, CLEVIDIPINE BUTYRATE: HCPCS

## 2025-01-03 PROCEDURE — 25000003 PHARM REV CODE 250: Performed by: STUDENT IN AN ORGANIZED HEALTH CARE EDUCATION/TRAINING PROGRAM

## 2025-01-03 PROCEDURE — 97530 THERAPEUTIC ACTIVITIES: CPT

## 2025-01-03 PROCEDURE — 83735 ASSAY OF MAGNESIUM: CPT

## 2025-01-03 PROCEDURE — 85025 COMPLETE CBC W/AUTO DIFF WBC: CPT

## 2025-01-03 PROCEDURE — 80053 COMPREHEN METABOLIC PANEL: CPT

## 2025-01-03 PROCEDURE — 11000001 HC ACUTE MED/SURG PRIVATE ROOM

## 2025-01-03 PROCEDURE — 21400001 HC TELEMETRY ROOM

## 2025-01-03 PROCEDURE — 63600175 PHARM REV CODE 636 W HCPCS: Mod: TB

## 2025-01-03 PROCEDURE — 25000003 PHARM REV CODE 250: Performed by: INTERNAL MEDICINE

## 2025-01-03 PROCEDURE — C9248 INJ, CLEVIDIPINE BUTYRATE: HCPCS | Mod: TB

## 2025-01-03 PROCEDURE — 36415 COLL VENOUS BLD VENIPUNCTURE: CPT

## 2025-01-03 PROCEDURE — 25000003 PHARM REV CODE 250

## 2025-01-03 RX ORDER — HYDRALAZINE HYDROCHLORIDE 20 MG/ML
10 INJECTION INTRAMUSCULAR; INTRAVENOUS EVERY 6 HOURS PRN
Status: DISCONTINUED | OUTPATIENT
Start: 2025-01-03 | End: 2025-01-06 | Stop reason: HOSPADM

## 2025-01-03 RX ADMIN — LABETALOL HYDROCHLORIDE 10 MG: 5 INJECTION, SOLUTION INTRAVENOUS at 10:01

## 2025-01-03 RX ADMIN — NIFEDIPINE 30 MG: 30 TABLET, FILM COATED, EXTENDED RELEASE ORAL at 07:01

## 2025-01-03 RX ADMIN — LABETALOL HYDROCHLORIDE 10 MG: 5 INJECTION, SOLUTION INTRAVENOUS at 05:01

## 2025-01-03 RX ADMIN — LEVETIRACETAM 500 MG: 100 INJECTION, SOLUTION INTRAVENOUS at 08:01

## 2025-01-03 RX ADMIN — CLEVIPIDINE 3 MG/HR: 0.5 EMULSION INTRAVENOUS at 06:01

## 2025-01-03 RX ADMIN — CARVEDILOL 6.25 MG: 3.12 TABLET, FILM COATED ORAL at 07:01

## 2025-01-03 RX ADMIN — LEVETIRACETAM 500 MG: 100 INJECTION, SOLUTION INTRAVENOUS at 09:01

## 2025-01-03 RX ADMIN — MUPIROCIN: 20 OINTMENT TOPICAL at 08:01

## 2025-01-03 RX ADMIN — LOSARTAN POTASSIUM 100 MG: 50 TABLET, FILM COATED ORAL at 07:01

## 2025-01-03 RX ADMIN — CARVEDILOL 6.25 MG: 3.12 TABLET, FILM COATED ORAL at 09:01

## 2025-01-03 RX ADMIN — NICOTINE 1 PATCH: 21 PATCH, EXTENDED RELEASE TRANSDERMAL at 09:01

## 2025-01-03 RX ADMIN — HYDRALAZINE HYDROCHLORIDE 10 MG: 20 INJECTION INTRAMUSCULAR; INTRAVENOUS at 05:01

## 2025-01-03 RX ADMIN — HYDRALAZINE HYDROCHLORIDE 10 MG: 20 INJECTION INTRAMUSCULAR; INTRAVENOUS at 12:01

## 2025-01-03 NOTE — PLAN OF CARE
Problem: Adult Inpatient Plan of Care  Goal: Plan of Care Review  Outcome: Progressing  Goal: Patient-Specific Goal (Individualized)  Outcome: Progressing  Goal: Absence of Hospital-Acquired Illness or Injury  Outcome: Progressing  Goal: Optimal Comfort and Wellbeing  Outcome: Progressing  Goal: Readiness for Transition of Care  Outcome: Progressing     Problem: Stroke, Intracerebral Hemorrhage  Goal: Optimal Coping  Outcome: Progressing  Goal: Effective Bowel Elimination  Outcome: Progressing  Goal: Optimal Cerebral Tissue Perfusion  Outcome: Progressing  Goal: Optimal Cognitive Function  Outcome: Progressing  Goal: Effective Communication Skills  Outcome: Progressing  Goal: Optimal Functional Ability  Outcome: Progressing  Goal: Optimal Nutrition Intake  Outcome: Progressing  Goal: Optimal Pain Control and Function  Outcome: Progressing  Goal: Effective Oxygenation and Ventilation  Outcome: Progressing  Goal: Improved Sensorimotor Function  Outcome: Progressing  Goal: Safe and Effective Swallow  Outcome: Progressing  Goal: Effective Urinary Elimination  Outcome: Progressing     Problem: Fall Injury Risk  Goal: Absence of Fall and Fall-Related Injury  Outcome: Progressing

## 2025-01-03 NOTE — PROGRESS NOTES
[facility] - Emergency Dept  Pulmonary Critical Care Note    Patient Name: Ramon Pop  MRN: [id number]  Admission Date: 12/31/2024  Hospital Length of Stay: 3 days  Code Status: No Order  Attending Provider: [place] Hills MD  Primary Care Provider: Elen, Primary Doctor     Subjective:     HPI:   Ramon Pop is a 49 y.o. male with no significant PMH who presented to the ED on 12/31/2024 with CC of stroke like symptoms. Patient states onset was this morning when he noticed his facial drooping with slurred speech and blurry vision. He also noticed weakness of right UE and LE. He states he could walk but feeling off-balance. He denies health issues and has no primary care physician. He admits smoking since 13 y.o. and been smoking 2 ppd x 30 years. He adds heavily drinking alcohol for 10 years and cutting down to 0.5-1 pint per month for past 3 years - last intake 2 weeks ago. He uses marijuana regularly. Endorses cocaine use-last intake 2 weeks ago. He denies HA, dizziness, CP, SOB, abdominal pain, urinary difficulties, constipation/diarrhea, and fall.     In ED, upon arrival /155. Other V/S stable. Given labetalol IV 10 mg and started on clevidipine.  CTH showed left basal ganglial hemorrhage without evidence of aneurysm or large vessel occlusion. Neurology and neurosurgery consulted. Critical care medicine consulted for admission to ICU for BP management and Q1H neurocheck.       Hospital Course/Significant events:  12/31/2024 - Admitted to ICU       24 Hour Interval History:  No acute events overnight.  No new neurologic deficits.  He is awake and alert.  Full range of motion.  Ate breakfast well this morning in his been working with physical therapy.  Cleviprex has been discontinued.  Oral therapy has been increased and blood pressure is stable.    Review of systems negative unless documented in the history of present illness.        No past medical history on file.    No past surgical  history on file.    Social History     Socioeconomic History    Marital status: Significant Other     Social Drivers of Health     Financial Resource Strain: Patient Declined (12/31/2024)    Overall Financial Resource Strain (CARDIA)     Difficulty of Paying Living Expenses: Patient declined   Food Insecurity: Patient Declined (12/31/2024)    Hunger Vital Sign     Worried About Running Out of Food in the Last Year: Patient declined     Ran Out of Food in the Last Year: Patient declined   Transportation Needs: Patient Declined (12/31/2024)    TRANSPORTATION NEEDS     Transportation : Patient declined   Stress: Patient Declined (12/31/2024)    [employer] - Occupational Stress Questionnaire     Feeling of Stress : Patient declined   Housing Stability: Patient Declined (12/31/2024)    Housing Stability Vital Sign     Unable to Pay for Housing in the Last Year: Patient declined     Homeless in the Last Year: Patient declined       No current outpatient medications  Current Inpatient Medications   carvediloL  6.25 mg Oral BID    levETIRAcetam (Keppra) IV (PEDS and ADULTS)  500 mg Intravenous Q12H    losartan  100 mg Oral Daily    mupirocin   Nasal BID    nicotine  1 patch Transdermal Daily    NIFEdipine  30 mg Oral Daily     Current Intravenous Infusions        Objective:     Intake/Output Summary (Last 24 hours) at 1/3/2025 0926  Last data filed at 1/3/2025 0745  Gross per 24 hour   Intake 499.74 ml   Output 1310 ml   Net -810.26 ml     Vital Signs (Most Recent):  Temp: 97.7 °F (36.5 °C) (01/03/25 0802)  Pulse: 78 (01/03/25 0900)  Resp: 14 (01/03/25 0900)  BP: (!) 144/111 (01/03/25 0802)  SpO2: (!) 94 % (01/03/25 0900)  Body mass index is 31.12 kg/m².  Weight: 99.8 kg (220 lb 0.3 oz) Vital Signs (24h Range):  Temp:  [97.6 °F (36.4 °C)-98 °F (36.7 °C)] 97.7 °F (36.5 °C)  Pulse:  [61-88] 78  Resp:  [13-35] 14  SpO2:  [91 %-97 %] 94 %  BP: (116-170)/() 144/111         Physical Exam:  Gen-  A/O, NAD  HENT- ATNC, right facial droop  CV- RRR  Resp- CTAB, normal work of breathing   MSK- WWP, no edema  Neuro- 5/5 strength in BLE, 4+/5 RUE strength, mild right facial droop   Psych- appropriate mood and affect         Lines/Drains/Airways       Peripheral Intravenous Line  Duration                  Peripheral IV - Single Lumen 01/01/25 1135 20 G Anterior;Proximal;Right Forearm 1 day         Peripheral IV - Single Lumen 01/02/25 0800 20 G Anterior;Left Hand 1 day                  Significant Labs:  Lab Results   Component Value Date    WBC 10.13 01/03/2025    HGB 16.4 01/03/2025    HCT 48.9 01/03/2025    MCV 92.1 01/03/2025     01/03/2025       BMP  Lab Results   Component Value Date     01/03/2025    K 4.1 01/03/2025    CO2 19 (L) 01/03/2025    BUN 16.8 01/03/2025    CREATININE 1.32 (H) 01/03/2025    CALCIUM 8.6 01/03/2025    AGAP 9.0 01/03/2025    EGFRNONAA >60 09/21/2017         Assessment/Plan:     Assessment  Left basal ganglia intraparenchymal hemorrhage  Hypertensive emergency.  Resolved  Tobacco dependence  Polysubstance use     Plan  Appears stable to transfer out of ICU.  Continue Keppra for secondary seizure prophylaxis, hold anticoagulants and antiplatelets  Ambulatory with the assistance of PT, significant improvement in lower extremity weakness since admission  Neurosurgery evaluated with no surgical interventions recommended  Increase oral antihypertensive regimen today        DVT Prophylaxis: SCDs, hold chemical anticoagulation due to intracranial hemorrhage  GI Prophylaxis: None         Pulmonary Critical Care Medicine  [facility]

## 2025-01-03 NOTE — PT/OT/SLP PROGRESS
Occupational Therapy   Treatment    Name: Ramon Pop  MRN: [id number]  Admitting Diagnosis:  <principal problem not specified>       Recommendations:     Recommended therapy intensity at discharge: Low Intensity Therapy   Discharge Equipment Recommendations:  none  Barriers to discharge:  ongoing medical needs    Assessment:     Ramon Pop is a 49 y.o. male with a medical diagnosis of L basal ganglia intraparenchymal hemorrhage, hypertensive emergency, hx of tobacco dependence and polysubstance abuse.  He presents with elevated BP therefore bed level coordination and dexterity activities performed.  RN notified and administered meds. Performance deficits affecting function are impaired coordination, gait instability, impaired functional mobility, impaired self care skills.     Pending medicaid, provided with HEP.  Discussed recommended OP OT.    Rehab Prognosis:  Good; patient would benefit from acute skilled OT services to address these deficits and reach maximum level of function.       Plan:     Patient to be seen 2 x/week to address the above listed problems via self-care/home management, therapeutic activities, therapeutic exercises, neuromuscular re-education  Plan of Care Expires: 01/29/25  Plan of Care Reviewed with: patient    Subjective     Pain/Comfort:  No pain    Objective:     Communicated with: RN prior to session.  Patient found HOB elevated with  (vital monitoring) upon OT entry to room.    General Precautions: Standard, fall, other (see comments) (SPB <140)    Orthopedic Precautions:N/A  Braces: N/A  Vital Signs: 174/132     MOBILITY does not occur due to hypertension.  Bed level therAct performed.    Therapeutic Activities:  Pt participated in RUE fine motor activities/manipulation, purdue peg board modified due to missing pieces.  Pt with 4/20 occasions of dropping pegs.  Mod difficulty with spacer and washer.    Patient Education:  Patient provided with verbal education education regarding  OT role/goals/POC and Discharge/DME recommendations.  Understanding was verbalized, however additional teaching warranted.  Pt provided with HEP as he is pending insurance.      Patient left HOB elevated with all lines intact.    GOALS:   Multidisciplinary Problems       Occupational Therapy Goals          Problem: Occupational Therapy    Goal Priority Disciplines Outcome Interventions   Occupational Therapy Goal     OT, PT/OT Progressing    Description: Goals to be met by 2/1/2025    Pt will complete grooming standing at sink with LRAD with independence.  Pt will complete functional mobility to/from toilet and toilet transfer with independence using LRAD.  Pt will complete self-feeding with independence.                         Time Tracking:     OT Date of Treatment:    OT Start Time: 1042  OT Stop Time: 1120  OT Total Time (min): 38 min    Billable Minutes:Therapeutic Activity 3          Number of OSCAR visits since last OT visit: 0    1/3/2025

## 2025-01-03 NOTE — H&P
MD Addendum:  I, Dr. Galicia assumed care of this patient today.  For the patient encounter, I performed the substantive portion of the visit, I reviewed the NP/PA documentation, treatment plan, and medical decision making.  I had face to face time with this patient      Downgraded out of the ICU noted to have left basal ganglia intraparenchymal hemorrhage with no plans for surgical intervention.    On admission patient is noted to have hypertensive emergency which has since resolved however blood pressure is still significantly elevated.    He has a noted history of tobacco dependence and polysubstance abuse.    UDS not obtained on admission          All diagnosis and differential diagnosis have been reviewed; assessment and plan has been documented; I have personally reviewed the labs and test results that are presently available; I have reviewed the patients medication list; I have reviewed the consulting providers response and recommendations. I have reviewed or attempted to review medical records based upon their availability.    I will continue to monitor closely and make adjustments to medical management as needed.     Dr. Brandy Galicia DO    I have spent 30 minutes on the day of the visit; time spent includes face to face time and non-face to face time preparing to see the patient (eg, review of tests), independently reviewing and interpreting medical records, both past and current; documenting clinical information in the electronic or other health record, and communicating results to the patient/family/caregiver and care coordinator and nursing team.

## 2025-01-03 NOTE — PROGRESS NOTES
01/03/25 1101   Missed Time Reason   SLP Attempted Eval Date 01/03/25   SLP Attempted Eval Time 1101   Missed Treatment Reason Other (Comment)     SLP attempting to see pt for initial speech, language, and cognition evaluation however pt working with OT at this time and unavailable. SLP to re-attempt as schedule allows.

## 2025-01-03 NOTE — H&P
[facility] Hospital Medicine History & Physical Examination       Patient Name: Ramon Pop  MRN: [id number]  Patient Class: IP- Inpatient   Admission Date: 12/31/2024   Admitting Physician: GLORIA Service   Length of Stay: 3  Attending Physician: Dr Brandy Galicia  Primary Care Provider: Elen, Primary Doctor  Face-to-Face encounter date: 01/03/2025  Code Status: Full code  Chief Complaint: Cerebrovascular Accident (Right arm weakness, right facial droop, slurred speech LSN 0845am. Woke up normal)        Screening for Social Drivers for health:  Patient screened for food insecurity, housing instability, transportation needs, utility difficulties, and interpersonal safety (select all that apply as identified as concern)  []Housing or Food  []Transportation Needs  []Utility Difficulties  []Interpersonal safety  [x]None    Patient information was obtained from patient, patient's family, past medical records and/or ER records.     HISTORY OF PRESENT ILLNESS:   Ramon Pop is a 49 y.o. male who  reports no PMH presents to the ED [facility] on 12/31/2024 with a primary complaint of  stroke like symptoms with reports of  facial drooping with slurred speech and blurry vision. He also noticed weakness of right UE and LE. He states he could walk but feeling off-balance. Patient admits smoking since 13 y.o. and been smoking 2 ppd x 30 years. He adds heavily drinking alcohol for 10 years and cutting down to 0.5-1 pint per month for past 3 years - last intake 2 weeks ago. He uses marijuana regularly. Endorses cocaine use-last intake 2 weeks ago. He denies HA, dizziness, CP, SOB, abdominal pain, urinary difficulties. In the ED it was reported /155.. PT received labetalol IV 10 mg and started on clevidipine.  He was admitted to the ICU unit. CTH showed left basal ganglial hemorrhage without evidence of aneurysm or large vessel occlusion. Neurology and neurosurgery consulted.   PAST MEDICAL HISTORY:    Denies    PAST SURGICAL HISTORY:   Denies    ALLERGIES:   Penicillins    FAMILY HISTORY:   Reviewed and negative    SOCIAL HISTORY:     Social History     Tobacco Use    Smoking status: Not on file    Smokeless tobacco: Not on file   Substance Use Topics    Alcohol use: Not on file        HOME MEDICATIONS:   As documented:  NONE    REVIEW OF SYSTEMS:   Except as documented, all other systems reviewed and negative     PHYSICAL EXAM:     VITAL SIGNS: 24 HRS MIN & MAX LAST   Temp  Min: 97.6 °F (36.4 °C)  Max: 98.1 °F (36.7 °C) 98.1 °F (36.7 °C)   BP  Min: 116/87  Max: 156/107 122/86   Pulse  Min: 61  Max: 88  75   Resp  Min: 13  Max: 35 18   SpO2  Min: 91 %  Max: 97 % 95 %     Physical exam per MD      LABS AND IMAGING:     Recent Labs   Lab 01/01/25  0733 01/02/25  0349 01/03/25  0343   WBC 7.78 9.86 10.13   RBC 5.26 5.39 5.31   HGB 16.3 16.6 16.4   HCT 49.4 50.6 48.9   MCV 93.9 93.9 92.1   MCH 31.0 30.8 30.9   MCHC 33.0 32.8* 33.5   RDW 14.4 14.6 14.3    207 171   MPV 11.4* 11.0* 10.8*       Recent Labs   Lab 01/01/25  0733 01/02/25 0349 01/03/25  0343    138 139   K 4.4 4.2 4.1   * 109* 111*   CO2 19* 20* 19*   BUN 10.6 13.9 16.8   CREATININE 1.25 1.36* 1.32*   CALCIUM 9.0 8.6 8.6   MG 2.10 2.30 2.20   ALBUMIN 4.2 3.8 3.7   ALKPHOS 64 58 61   ALT 32 25 24   AST 17 13 10   BILITOT 0.7 0.5 0.7       Microbiology Results (last 7 days)       ** No results found for the last 168 hours. **             CT Head Without Contrast  Narrative: EXAMINATION:  CT HEAD WITHOUT CONTRAST    CLINICAL HISTORY:  Stroke, follow up;    TECHNIQUE:  Multiple axial images were obtained from the base of the brain to the vertex without contrast administration.  Sagittal and coronal reconstructions were performed. .Automatic exposure control  (AEC) is utilized to reduce patient radiation exposure.    COMPARISON:  12/31/2024    FINDINGS:  There is no intracranial mass or lesion seen.  There is a stable left basal ganglia  hemorrhage seen.  There is a mild amount of surrounding cytotoxic edema.  No new areas of hemorrhage are seen.  No significant midline shift is seen..  No infarct is seen.  The ventricles and basilar cisterns appear normal.  Brain parenchyma appears grossly unremarkable.    Posterior fossa appears normal.  The calvarium is intact.  The paranasal sinuses appear grossly unremarkable.  Impression: No change in the intracranial hemorrhage    Electronically signed by: Albert Alvarez  Date:    12/31/2024  Time:    15:42  X-Ray Chest AP Portable  Narrative: EXAMINATION:  XR CHEST AP PORTABLE    CLINICAL HISTORY:  Other symptoms and signs involving the nervous system    COMPARISON:  Chest x-ray dated 09/21/2017    FINDINGS:  The heart is normal in size.  The lungs are clear.  There is no pleural effusion or visible pneumothorax.  Impression: No acute abnormality of the chest.    Electronically signed by: Mali Simpson  Date:    12/31/2024  Time:    11:33  CTA Head and Neck (xpd)  Narrative: EXAMINATION:  CTA HEAD AND NECK (XPD)    CLINICAL HISTORY:  Neuro deficit, acute, stroke suspected;    TECHNIQUE:  Non contrast low dose axial images were obtained through the head.  CT angiogram was performed from the level of the elaine to the top of the head following the IV administration 100 cc of Isovue 370 contrast .  Sagittal and coronal reconstructions and maximum intensity projection reconstructions were performed. Arterial stenosis percentages are based on NASCET measurement criteria.  Additional multiplanar reconstructions were performed on post processed imaging.  Automatic exposure control (AEC) is utilized to reduce patient radiation exposure.    COMPARISON:  None    FINDINGS:  Source images: No intracranial mass lesion is seen.  There is a focal rounded area hemorrhage in the basal ganglia side which is stable..  No infarct is seen.  Ventricles and basilar cisterns appear grossly unremarkable.  No cervical mass or  lesion is seen.  No abnormal lymphadenopathy seen.  Thyroid appears normal.  Larynx appears normal.  Trachea is midline.  Thoracic inlet appears normal.    Vascular images: Aortic arch is normal in caliber.  There is a 3 vessel arch seen.  The common carotid arteries are widely patent.  No significant plaque is seen in the carotid bulbs or proximal internal carotid arteries.  The distal internal carotid arteries are widely patent and seen to level the petrous ridge and clinoid supraclinoid portions with only minimal amount of calcifications seen in the clinoid portion of the right internal carotid artery.  No significant stenosis is seen.    The MCAs are patent.  The M1 segments, M2 segments M3 segments are patent.  No aneurysm or obstruction is seen.  A1 segments are patent.  Anterior cerebral arteries are widely patent.  No aneurysm or obstruction is seen.    Both vertebral arteries are patent.  Left vertebral artery is dominant.  No evidence of dissection is seen.  No fibromuscular dysplasia is seen.  Both vertebral arteries perfuse a normal appearing basilar artery.    The posterior cerebral arteries are widely patent.  No aneurysm or obstruction is seen.    .  Impression: No evidence of aneurysm or large vessel occlusion seen    Stable left basal ganglia hemorrhage    Electronically signed by: Albert Alvarez  Date:    12/31/2024  Time:    10:07  CT HEAD FOR STROKE  Narrative: EXAMINATION:  CT HEAD FOR STROKE    CLINICAL HISTORY:  Neuro deficit, acute, stroke suspected;    TECHNIQUE:  Axial scans were obtained from skull base to the vertex.    Coronal and sagittal reconstructions obtained from the axial data.    Automatic exposure control was utilized to limit radiation dose.    Contrast: None    Radiation Dose:    Total DLP: 1017 mGy*cm    COMPARISON:  None    FINDINGS:  Small left basal ganglia hematoma measures 1.7 cm in size with mild surrounding edema.  Patchy hypodensities in the subcortical and  periventricular white matter likely represent chronic microvascular ischemic changes.    There is no significant mass effect or midline shift.  The basal cisterns are patent.  The ventricles are normal in size.  There is no abnormal extra-axial fluid collection.  The calvarium and skull base are intact.  Impression: Small left basal ganglia hematoma.    Code fast findings given to Dr. Huerta at the time of dictation.    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   Tobacco abuse-POA   History of ETOH abuse-POA       PLAN:  Neurology Services evaluated the patient appreciate assistance and recommendations   Neurosurgery Services evaluated the patient appreciate assistance and recommendations   Continue with neurochecks   Seizure precautions   Continue with Keppra therapy  Blood pressure parameters per protocol   Fall precautions   Continue with therapy services   Case management to assist with discharge planning      VTE Prophylaxis: SCD for DVT prophylaxis and will be advised to be as mobile as possible and sit in a chair as tolerated    Patient condition:  Stable  __________________________________________________________________________  INPATIENT LIST OF MEDICATIONS     Scheduled Meds:   carvediloL  6.25 mg Oral BID    levETIRAcetam (Keppra) IV (PEDS and ADULTS)  500 mg Intravenous Q12H    losartan  100 mg Oral Daily    mupirocin   Nasal BID    nicotine  1 patch Transdermal Daily    NIFEdipine  30 mg Oral Daily     Continuous Infusions:  PRN Meds:.  Current Facility-Administered Medications:     bisacodyL, 10 mg, Rectal, Daily PRN    hydrALAZINE, 10 mg, Intravenous, Q6H PRN    hydrOXYzine, 50 mg, Intramuscular, Q6H PRN    labetalol, 10 mg, Intravenous, Q15 Min PRN      Carla KINGSLEY FNP  have reviewed and discussed the case with   Dr. Brandy Galicia.  Please see the following addendum for further assessment and plan from their attending MD.  Carla Drake, [facility]   01/03/2025    ________________________________________________________________________________    MD Addendum:  IDr. ---assumed care of this patient today at ---am/pm  For the patient encounter, I performed the substantive portion of the visit, I reviewed the NP/PA documentation, treatment plan, and medical decision making.  I had face to face time with this patient     A. History:    B. Physical exam:    C. Medical decision making:    Discharge Planning and Disposition: No mobility needs. Ambulating well. Good social support system.   Anticipated discharge    All diagnosis and differential diagnosis have been reviewed; assessment and plan has been documented; I have personally reviewed the labs and test results that are presently available; I have reviewed the patients medication list; I have reviewed the consulting providers response and recommendations. I have reviewed or attempted to review medical records based upon their availability.    All of the patient and family questions have been addressed and answered. Patient's is agreeable to the above stated plan. I will continue to monitor closely and make adjustments to medical management as needed.

## 2025-01-04 PROCEDURE — 99900031 HC PATIENT EDUCATION (STAT)

## 2025-01-04 PROCEDURE — 99900035 HC TECH TIME PER 15 MIN (STAT)

## 2025-01-04 PROCEDURE — 25000003 PHARM REV CODE 250

## 2025-01-04 PROCEDURE — 63600175 PHARM REV CODE 636 W HCPCS

## 2025-01-04 PROCEDURE — 11000001 HC ACUTE MED/SURG PRIVATE ROOM

## 2025-01-04 PROCEDURE — 25000003 PHARM REV CODE 250: Performed by: STUDENT IN AN ORGANIZED HEALTH CARE EDUCATION/TRAINING PROGRAM

## 2025-01-04 PROCEDURE — 25000003 PHARM REV CODE 250: Performed by: NURSE PRACTITIONER

## 2025-01-04 PROCEDURE — 25000003 PHARM REV CODE 250: Performed by: INTERNAL MEDICINE

## 2025-01-04 PROCEDURE — 21400001 HC TELEMETRY ROOM

## 2025-01-04 PROCEDURE — 94760 N-INVAS EAR/PLS OXIMETRY 1: CPT

## 2025-01-04 PROCEDURE — S4991 NICOTINE PATCH NONLEGEND: HCPCS

## 2025-01-04 PROCEDURE — 63600175 PHARM REV CODE 636 W HCPCS: Performed by: NURSE PRACTITIONER

## 2025-01-04 RX ORDER — HYDRALAZINE HYDROCHLORIDE 25 MG/1
25 TABLET, FILM COATED ORAL EVERY 8 HOURS
Status: DISCONTINUED | OUTPATIENT
Start: 2025-01-04 | End: 2025-01-05

## 2025-01-04 RX ADMIN — MUPIROCIN: 20 OINTMENT TOPICAL at 08:01

## 2025-01-04 RX ADMIN — CARVEDILOL 6.25 MG: 3.12 TABLET, FILM COATED ORAL at 08:01

## 2025-01-04 RX ADMIN — CARVEDILOL 6.25 MG: 3.12 TABLET, FILM COATED ORAL at 10:01

## 2025-01-04 RX ADMIN — MUPIROCIN: 20 OINTMENT TOPICAL at 10:01

## 2025-01-04 RX ADMIN — HYDRALAZINE HYDROCHLORIDE 25 MG: 25 TABLET ORAL at 01:01

## 2025-01-04 RX ADMIN — HYDRALAZINE HYDROCHLORIDE 25 MG: 25 TABLET ORAL at 10:01

## 2025-01-04 RX ADMIN — LABETALOL HYDROCHLORIDE 10 MG: 5 INJECTION, SOLUTION INTRAVENOUS at 12:01

## 2025-01-04 RX ADMIN — LEVETIRACETAM 500 MG: 100 INJECTION, SOLUTION INTRAVENOUS at 08:01

## 2025-01-04 RX ADMIN — LOSARTAN POTASSIUM 100 MG: 50 TABLET, FILM COATED ORAL at 08:01

## 2025-01-04 RX ADMIN — NICOTINE 1 PATCH: 21 PATCH, EXTENDED RELEASE TRANSDERMAL at 08:01

## 2025-01-04 RX ADMIN — LEVETIRACETAM 500 MG: 100 INJECTION, SOLUTION INTRAVENOUS at 10:01

## 2025-01-04 RX ADMIN — NIFEDIPINE 30 MG: 30 TABLET, FILM COATED, EXTENDED RELEASE ORAL at 08:01

## 2025-01-04 NOTE — PLAN OF CARE
Problem: Adult Inpatient Plan of Care  Goal: Plan of Care Review  Outcome: Progressing  Goal: Patient-Specific Goal (Individualized)  Outcome: Progressing  Goal: Absence of Hospital-Acquired Illness or Injury  Outcome: Progressing  Goal: Optimal Comfort and Wellbeing  Outcome: Progressing  Goal: Readiness for Transition of Care  Outcome: Progressing     Problem: Stroke, Intracerebral Hemorrhage  Goal: Optimal Coping  Outcome: Progressing  Goal: Optimal Cerebral Tissue Perfusion  Outcome: Progressing  Goal: Optimal Cognitive Function  Outcome: Progressing  Goal: Optimal Nutrition Intake  Outcome: Progressing

## 2025-01-04 NOTE — PROGRESS NOTES
[facility] Hospital Medicine Progress Note      Chief Complaint: facial dropping and slurred speech        HPI: (personally reviewed by me and is documented from initial H&P)     Ramon Pop is a 49 y.o. male who  has no past medical history on file..  presents to the ED [facility] on 12/31/2024 with a primary complaint of  stroke like symptoms with reports of  facial drooping with slurred speech and blurry vision. He also noticed weakness of right UE and LE. He states he could walk but feeling off-balance. Patient admits smoking since 13 y.o. and been smoking 2 ppd x 30 years. He adds heavily drinking alcohol for 10 years and cutting down to 0.5-1 pint per month for past 3 years - last intake 2 weeks ago. He uses marijuana regularly. Endorses cocaine use-last intake 2 weeks ago. He denies HA, dizziness, CP, SOB, abdominal pain, urinary difficulties. In the ED it was reported /155.. PT received labetalol IV 10 mg and started on clevidipine.  He was admitted to the ICU unit. CTH showed left basal ganglial hemorrhage without evidence of aneurysm or large vessel occlusion. Neurology and neurosurgery consulted.       Interval History:        01/04/2025 patient remains hypertensive and complaining of having a regular diet that he feels is different from ICU.   Diet changed to cardiac.   Wife present at bedside     Objective Assessment:  Physical Exam      Vital signs have been personally reviewed by me   General: Appears comfortable, no acute distress.  Neuro: awake alert, oriented.     Integumentary: Warm, dry, intact.  Musculoskeletal: Purposeful movement noted.     Respiratory: No accessory muscle use. Breath sounds are equal.  Cardiovascular: Regular rate.       VITAL SIGNS: 24 HRS MIN & MAX LAST   Temp  Min: 97.7 °F (36.5 °C)  Max: 98.1 °F (36.7 °C) 98.1 °F (36.7 °C)   BP  Min: 142/85  Max: 168/107 (!) 168/107   Pulse  Min: 73  Max: 92  73   Resp  Min: 14  Max: 21 (!) 21   SpO2   Min: 95 %  Max: 97 % 95 %     CT Head Without Contrast  Narrative: EXAMINATION:  CT HEAD WITHOUT CONTRAST    CLINICAL HISTORY:  Stroke, follow up;    TECHNIQUE:  Multiple axial images were obtained from the base of the brain to the vertex without contrast administration.  Sagittal and coronal reconstructions were performed. .Automatic exposure control  (AEC) is utilized to reduce patient radiation exposure.    COMPARISON:  12/31/2024    FINDINGS:  There is no intracranial mass or lesion seen.  There is a stable left basal ganglia hemorrhage seen.  There is a mild amount of surrounding cytotoxic edema.  No new areas of hemorrhage are seen.  No significant midline shift is seen..  No infarct is seen.  The ventricles and basilar cisterns appear normal.  Brain parenchyma appears grossly unremarkable.    Posterior fossa appears normal.  The calvarium is intact.  The paranasal sinuses appear grossly unremarkable.  Impression: No change in the intracranial hemorrhage    Electronically signed by: Albert Alvarez  Date:    12/31/2024  Time:    15:42  X-Ray Chest AP Portable  Narrative: EXAMINATION:  XR CHEST AP PORTABLE    CLINICAL HISTORY:  Other symptoms and signs involving the nervous system    COMPARISON:  Chest x-ray dated 09/21/2017    FINDINGS:  The heart is normal in size.  The lungs are clear.  There is no pleural effusion or visible pneumothorax.  Impression: No acute abnormality of the chest.    Electronically signed by: Mali Simpson  Date:    12/31/2024  Time:    11:33  CTA Head and Neck (xpd)  Narrative: EXAMINATION:  CTA HEAD AND NECK (XPD)    CLINICAL HISTORY:  Neuro deficit, acute, stroke suspected;    TECHNIQUE:  Non contrast low dose axial images were obtained through the head.  CT angiogram was performed from the level of the elaine to the top of the head following the IV administration 100 cc of Isovue 370 contrast .  Sagittal and coronal reconstructions and maximum intensity projection reconstructions  were performed. Arterial stenosis percentages are based on NASCET measurement criteria.  Additional multiplanar reconstructions were performed on post processed imaging.  Automatic exposure control (AEC) is utilized to reduce patient radiation exposure.    COMPARISON:  None    FINDINGS:  Source images: No intracranial mass lesion is seen.  There is a focal rounded area hemorrhage in the basal ganglia side which is stable..  No infarct is seen.  Ventricles and basilar cisterns appear grossly unremarkable.  No cervical mass or lesion is seen.  No abnormal lymphadenopathy seen.  Thyroid appears normal.  Larynx appears normal.  Trachea is midline.  Thoracic inlet appears normal.    Vascular images: Aortic arch is normal in caliber.  There is a 3 vessel arch seen.  The common carotid arteries are widely patent.  No significant plaque is seen in the carotid bulbs or proximal internal carotid arteries.  The distal internal carotid arteries are widely patent and seen to level the petrous ridge and clinoid supraclinoid portions with only minimal amount of calcifications seen in the clinoid portion of the right internal carotid artery.  No significant stenosis is seen.    The MCAs are patent.  The M1 segments, M2 segments M3 segments are patent.  No aneurysm or obstruction is seen.  A1 segments are patent.  Anterior cerebral arteries are widely patent.  No aneurysm or obstruction is seen.    Both vertebral arteries are patent.  Left vertebral artery is dominant.  No evidence of dissection is seen.  No fibromuscular dysplasia is seen.  Both vertebral arteries perfuse a normal appearing basilar artery.    The posterior cerebral arteries are widely patent.  No aneurysm or obstruction is seen.    .  Impression: No evidence of aneurysm or large vessel occlusion seen    Stable left basal ganglia hemorrhage    Electronically signed by: Albert Alvarez  Date:    12/31/2024  Time:    10:07  CT HEAD FOR STROKE  Narrative:  EXAMINATION:  CT HEAD FOR STROKE    CLINICAL HISTORY:  Neuro deficit, acute, stroke suspected;    TECHNIQUE:  Axial scans were obtained from skull base to the vertex.    Coronal and sagittal reconstructions obtained from the axial data.    Automatic exposure control was utilized to limit radiation dose.    Contrast: None    Radiation Dose:    Total DLP: 1017 mGy*cm    COMPARISON:  None    FINDINGS:  Small left basal ganglia hematoma measures 1.7 cm in size with mild surrounding edema.  Patchy hypodensities in the subcortical and periventricular white matter likely represent chronic microvascular ischemic changes.    There is no significant mass effect or midline shift.  The basal cisterns are patent.  The ventricles are normal in size.  There is no abnormal extra-axial fluid collection.  The calvarium and skull base are intact.  Impression: Small left basal ganglia hematoma.    Code fast findings given to Dr. Huerta at the time of dictation.    Electronically signed by: Mali Simpson  Date:    12/31/2024  Time:    09:49    Recent Labs   Lab 01/01/25  0733 01/02/25  0349 01/03/25  0343   WBC 7.78 9.86 10.13   RBC 5.26 5.39 5.31   HGB 16.3 16.6 16.4   HCT 49.4 50.6 48.9   MCV 93.9 93.9 92.1   MCH 31.0 30.8 30.9   MCHC 33.0 32.8* 33.5   RDW 14.4 14.6 14.3    207 171   MPV 11.4* 11.0* 10.8*       Recent Labs   Lab 01/01/25  0733 01/02/25  0349 01/03/25  0343    138 139   K 4.4 4.2 4.1   * 109* 111*   CO2 19* 20* 19*   BUN 10.6 13.9 16.8   CREATININE 1.25 1.36* 1.32*   CALCIUM 9.0 8.6 8.6   MG 2.10 2.30 2.20   ALBUMIN 4.2 3.8 3.7   ALKPHOS 64 58 61   ALT 32 25 24   AST 17 13 10   BILITOT 0.7 0.5 0.7     Microbiology Results (last 7 days)       ** No results found for the last 168 hours. **           Medications for Hospital Course     Scheduled Med:   carvediloL  6.25 mg Oral BID    hydrALAZINE  25 mg Oral Q8H    levETIRAcetam (Keppra) IV (PEDS and ADULTS)  500 mg Intravenous Q12H    losartan  100  mg Oral Daily    mupirocin   Nasal BID    nicotine  1 patch Transdermal Daily    NIFEdipine  30 mg Oral Daily      PRN Meds:    Current Facility-Administered Medications:     bisacodyL, 10 mg, Rectal, Daily PRN    hydrALAZINE, 10 mg, Intravenous, Q6H PRN    hydrOXYzine, 50 mg, Intramuscular, Q6H PRN    labetalol, 10 mg, Intravenous, Q15 Min PRN     Assessment and Plan        Chronic medical issues:  has no past medical history on file..  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   Diet changed to cardiac   Continue supportive care  Continue checking vital signs q4hrs.  Reviewed and restarted appropriate home medications.     DVT prophylaxis initiated   Nurse notified to page me if any changes occur     Consults: pulmonologist   I have personally reviewed the specialist documentation and/or have spoken to the specialist with regard to the care of this patient; recommendations are noted above.     Anticipated discharge and Disposition when medically stable: Discharge home in the morning     Therapy: PT/OT/Speech--no indication     Nutrition: cardiac diet     _______________________________________________________________________________________________________________________________      This is a critical care document  Critical Care Diagnosis:hypertensive urgency   Critical care interventions: hands on evaluation, review of labs/radiographs/records and discussions; assessing and managing the high probability of imminent or life-threatening deterioration of cardiorespiratory status.  Critical care time spent > 40 minutes.     I have spent 40 minutes  on the day of the visit; time spent includes face to face time and non-face to face time preparing to see the patient (eg, review of tests), independently reviewing and interpreting medical records, both past and current; documenting clinical information in the electronic or other health record, and communicating results to the patient/family/caregiver and care coordinator and nursing team.      All diagnosis and differential diagnosis have been reviewed,  interpreted and communicated appropriately to care team. assessment and plan has been documented; I have personally reviewed the labs and test results that are presently available and pertinent to this hospital course; I have reviewed medical records based upon their availability.    All of the patient's questions have been  addressed and answered. Patient's is agreeable to the above stated plan.   I will continue to monitor closely and make adjustments to medical management as needed.    Brandy Galicia,      01/04/2025     This note was created with the assistance of Dragon voice recognition software. There may be transcription errors as a result of using this technology however minimal. Effort has been made to assure accuracy of transcription but any obvious errors or omissions should be clarified with the author of the document.

## 2025-01-05 PROCEDURE — 21400001 HC TELEMETRY ROOM

## 2025-01-05 PROCEDURE — 63600175 PHARM REV CODE 636 W HCPCS

## 2025-01-05 PROCEDURE — 25000003 PHARM REV CODE 250: Performed by: STUDENT IN AN ORGANIZED HEALTH CARE EDUCATION/TRAINING PROGRAM

## 2025-01-05 PROCEDURE — 25000003 PHARM REV CODE 250: Performed by: INTERNAL MEDICINE

## 2025-01-05 RX ORDER — CHLORTHALIDONE 25 MG/1
50 TABLET ORAL DAILY
Status: DISCONTINUED | OUTPATIENT
Start: 2025-01-05 | End: 2025-01-06 | Stop reason: HOSPADM

## 2025-01-05 RX ORDER — NIFEDIPINE 60 MG/1
60 TABLET, EXTENDED RELEASE ORAL DAILY
Status: DISCONTINUED | OUTPATIENT
Start: 2025-01-06 | End: 2025-01-06 | Stop reason: HOSPADM

## 2025-01-05 RX ORDER — LEVETIRACETAM 500 MG/1
500 TABLET ORAL 2 TIMES DAILY
Status: DISCONTINUED | OUTPATIENT
Start: 2025-01-05 | End: 2025-01-06 | Stop reason: HOSPADM

## 2025-01-05 RX ORDER — HYDRALAZINE HYDROCHLORIDE 50 MG/1
50 TABLET, FILM COATED ORAL EVERY 8 HOURS
Status: DISCONTINUED | OUTPATIENT
Start: 2025-01-05 | End: 2025-01-06 | Stop reason: HOSPADM

## 2025-01-05 RX ADMIN — HYDRALAZINE HYDROCHLORIDE 50 MG: 50 TABLET ORAL at 09:01

## 2025-01-05 RX ADMIN — HYDRALAZINE HYDROCHLORIDE 10 MG: 20 INJECTION INTRAMUSCULAR; INTRAVENOUS at 11:01

## 2025-01-05 RX ADMIN — CARVEDILOL 6.25 MG: 3.12 TABLET, FILM COATED ORAL at 09:01

## 2025-01-05 RX ADMIN — CHLORTHALIDONE 50 MG: 25 TABLET ORAL at 12:01

## 2025-01-05 RX ADMIN — HYDRALAZINE HYDROCHLORIDE 25 MG: 25 TABLET ORAL at 06:01

## 2025-01-05 RX ADMIN — LOSARTAN POTASSIUM 100 MG: 50 TABLET, FILM COATED ORAL at 09:01

## 2025-01-05 RX ADMIN — HYDRALAZINE HYDROCHLORIDE 50 MG: 50 TABLET ORAL at 04:01

## 2025-01-05 RX ADMIN — LEVETIRACETAM 500 MG: 500 TABLET, FILM COATED ORAL at 09:01

## 2025-01-05 RX ADMIN — NIFEDIPINE 30 MG: 30 TABLET, FILM COATED, EXTENDED RELEASE ORAL at 09:01

## 2025-01-05 NOTE — NURSING
"CNA noted patient refused breakfast and refused to answer any of her questions this AM.  Pt refused to speak to night nurse throughout entire shift - at shift change pt refused to speak to Aida or I despite asking if he needed anything.    I entered pt room along w/BOBO (Vick) - let him know I had his morning medication.  Make him aware that Keppra was changed to PO and no longer through IV.  Pt kept eyes closed and did not respond.  I asked patient if he wanted to take his medication - he refused to answer.  I asked pt again if he would take his medication.  He stated "you want to give me my medication."   I told him that he has been ugly to everyone of us and all we are trying to do it take care of him.  He closed his eyes, sighed, turned away and mumbled something under his breath.    I kindly stepped out to go give medications to my other patients.  The night nurse mrs. Parra, was still at desk so I asked her if she wanted to try to give him his medication.  She said sure I will try.  Pt told Mrs. Parra that I was an entitled cunt and he will have a lawsuit against this hospital.    "
Report given to Dinorah. Patient transported via wheelchair to Room 406. Patient called Meseret Brian to notify new room number.   
Upon giving pt morning medications, he voiced some concern about feeling as if he was not being taken care of properly.  He stated that when he was in ICU his BP was better but now it is getting worse.  I educated pt that he was on IV drip in ICU where his BP was closely monitored.  We are unable to do that IV medication here and MD will try to find the right BP medication that works for him.  He also stated that he is on a regular diet with salt, he wasn't being taken care of properly.  I informed pt that I would contact Dr. Galicia and make her aware of his concerns.  Dr. Galicia arrived on unit approximately 5 minutes later, I made her aware that pt had questions/concerns. Dr. Galicia immediately went into his room.   Diet order changed to cardiac diet. Hydralazine 25mg PO TID order added.    
No

## 2025-01-05 NOTE — PROGRESS NOTES
[facility] Hospital Medicine Progress Note      Chief Complaint: facial dropping and slurred speech        HPI: (personally reviewed by me and is documented from initial H&P)     Ramon Pop is a 49 y.o. male who  has no past medical history on file..  presents to the ED [facility] on 12/31/2024 with a primary complaint of  stroke like symptoms with reports of  facial drooping with slurred speech and blurry vision. He also noticed weakness of right UE and LE. He states he could walk but feeling off-balance. Patient admits smoking since 13 y.o. and been smoking 2 ppd x 30 years. He adds heavily drinking alcohol for 10 years and cutting down to 0.5-1 pint per month for past 3 years - last intake 2 weeks ago. He uses marijuana regularly. Endorses cocaine use-last intake 2 weeks ago. He denies HA, dizziness, CP, SOB, abdominal pain, urinary difficulties. In the ED it was reported /155.. PT received labetalol IV 10 mg and started on clevidipine.  He was admitted to the ICU unit. CTH showed left basal ganglial hemorrhage without evidence of aneurysm or large vessel occlusion. Neurology and neurosurgery consulted.       Interval History:        01/05/2025 Nurse tells me that patient has been very rude; patient denies such.  Patient remains hospitalized for hypertensive urgency. He is noted to have stroke     Objective Assessment:  Physical Exam      Vital signs have been personally reviewed by me   General: Appears comfortable, no acute distress.  Neuro: awake alert, oriented.     Integumentary: Warm, dry, intact.  Musculoskeletal: Purposeful movement noted.     Respiratory: No accessory muscle use. Breath sounds are equal.  Cardiovascular: Regular rate.       VITAL SIGNS: 24 HRS MIN & MAX LAST   Temp  Min: 97.7 °F (36.5 °C)  Max: 98.2 °F (36.8 °C) 98.2 °F (36.8 °C)   BP  Min: 136/86  Max: 197/137 136/86   Pulse  Min: 67  Max: 95  95   Resp  Min: 18  Max: 20 20   SpO2  Min: 93 %  Max: 97 %  97 %     CT Head Without Contrast  Narrative: EXAMINATION:  CT HEAD WITHOUT CONTRAST    CLINICAL HISTORY:  Stroke, follow up;    TECHNIQUE:  Multiple axial images were obtained from the base of the brain to the vertex without contrast administration.  Sagittal and coronal reconstructions were performed. .Automatic exposure control  (AEC) is utilized to reduce patient radiation exposure.    COMPARISON:  12/31/2024    FINDINGS:  There is no intracranial mass or lesion seen.  There is a stable left basal ganglia hemorrhage seen.  There is a mild amount of surrounding cytotoxic edema.  No new areas of hemorrhage are seen.  No significant midline shift is seen..  No infarct is seen.  The ventricles and basilar cisterns appear normal.  Brain parenchyma appears grossly unremarkable.    Posterior fossa appears normal.  The calvarium is intact.  The paranasal sinuses appear grossly unremarkable.  Impression: No change in the intracranial hemorrhage    Electronically signed by: Albert Alvarez  Date:    12/31/2024  Time:    15:42  X-Ray Chest AP Portable  Narrative: EXAMINATION:  XR CHEST AP PORTABLE    CLINICAL HISTORY:  Other symptoms and signs involving the nervous system    COMPARISON:  Chest x-ray dated 09/21/2017    FINDINGS:  The heart is normal in size.  The lungs are clear.  There is no pleural effusion or visible pneumothorax.  Impression: No acute abnormality of the chest.    Electronically signed by: Mali Simpson  Date:    12/31/2024  Time:    11:33  CTA Head and Neck (xpd)  Narrative: EXAMINATION:  CTA HEAD AND NECK (XPD)    CLINICAL HISTORY:  Neuro deficit, acute, stroke suspected;    TECHNIQUE:  Non contrast low dose axial images were obtained through the head.  CT angiogram was performed from the level of the elaine to the top of the head following the IV administration 100 cc of Isovue 370 contrast .  Sagittal and coronal reconstructions and maximum intensity projection reconstructions were performed.  Arterial stenosis percentages are based on NASCET measurement criteria.  Additional multiplanar reconstructions were performed on post processed imaging.  Automatic exposure control (AEC) is utilized to reduce patient radiation exposure.    COMPARISON:  None    FINDINGS:  Source images: No intracranial mass lesion is seen.  There is a focal rounded area hemorrhage in the basal ganglia side which is stable..  No infarct is seen.  Ventricles and basilar cisterns appear grossly unremarkable.  No cervical mass or lesion is seen.  No abnormal lymphadenopathy seen.  Thyroid appears normal.  Larynx appears normal.  Trachea is midline.  Thoracic inlet appears normal.    Vascular images: Aortic arch is normal in caliber.  There is a 3 vessel arch seen.  The common carotid arteries are widely patent.  No significant plaque is seen in the carotid bulbs or proximal internal carotid arteries.  The distal internal carotid arteries are widely patent and seen to level the petrous ridge and clinoid supraclinoid portions with only minimal amount of calcifications seen in the clinoid portion of the right internal carotid artery.  No significant stenosis is seen.    The MCAs are patent.  The M1 segments, M2 segments M3 segments are patent.  No aneurysm or obstruction is seen.  A1 segments are patent.  Anterior cerebral arteries are widely patent.  No aneurysm or obstruction is seen.    Both vertebral arteries are patent.  Left vertebral artery is dominant.  No evidence of dissection is seen.  No fibromuscular dysplasia is seen.  Both vertebral arteries perfuse a normal appearing basilar artery.    The posterior cerebral arteries are widely patent.  No aneurysm or obstruction is seen.    .  Impression: No evidence of aneurysm or large vessel occlusion seen    Stable left basal ganglia hemorrhage    Electronically signed by: Albert Alvarez  Date:    12/31/2024  Time:    10:07  CT HEAD FOR STROKE  Narrative: EXAMINATION:  CT HEAD FOR  STROKE    CLINICAL HISTORY:  Neuro deficit, acute, stroke suspected;    TECHNIQUE:  Axial scans were obtained from skull base to the vertex.    Coronal and sagittal reconstructions obtained from the axial data.    Automatic exposure control was utilized to limit radiation dose.    Contrast: None    Radiation Dose:    Total DLP: 1017 mGy*cm    COMPARISON:  None    FINDINGS:  Small left basal ganglia hematoma measures 1.7 cm in size with mild surrounding edema.  Patchy hypodensities in the subcortical and periventricular white matter likely represent chronic microvascular ischemic changes.    There is no significant mass effect or midline shift.  The basal cisterns are patent.  The ventricles are normal in size.  There is no abnormal extra-axial fluid collection.  The calvarium and skull base are intact.  Impression: Small left basal ganglia hematoma.    Code fast findings given to Dr. Huerta at the time of dictation.    Electronically signed by: Mali Simpson  Date:    12/31/2024  Time:    09:49    Recent Labs   Lab 01/01/25  0733 01/02/25  0349 01/03/25  0343   WBC 7.78 9.86 10.13   RBC 5.26 5.39 5.31   HGB 16.3 16.6 16.4   HCT 49.4 50.6 48.9   MCV 93.9 93.9 92.1   MCH 31.0 30.8 30.9   MCHC 33.0 32.8* 33.5   RDW 14.4 14.6 14.3    207 171   MPV 11.4* 11.0* 10.8*       Recent Labs   Lab 01/01/25  0733 01/02/25  0349 01/03/25  0343    138 139   K 4.4 4.2 4.1   * 109* 111*   CO2 19* 20* 19*   BUN 10.6 13.9 16.8   CREATININE 1.25 1.36* 1.32*   CALCIUM 9.0 8.6 8.6   MG 2.10 2.30 2.20   ALBUMIN 4.2 3.8 3.7   ALKPHOS 64 58 61   ALT 32 25 24   AST 17 13 10   BILITOT 0.7 0.5 0.7     Microbiology Results (last 7 days)       ** No results found for the last 168 hours. **           Medications for Hospital Course     Scheduled Med:   carvediloL  6.25 mg Oral BID    chlorthalidone  50 mg Oral Daily    hydrALAZINE  50 mg Oral Q8H    levETIRAcetam  500 mg Oral BID    losartan  100 mg Oral Daily    mupirocin    Nasal BID    nicotine  1 patch Transdermal Daily    [START ON 1/6/2025] NIFEdipine  60 mg Oral Daily      PRN Meds:    Current Facility-Administered Medications:     bisacodyL, 10 mg, Rectal, Daily PRN    hydrALAZINE, 10 mg, Intravenous, Q6H PRN    hydrOXYzine, 50 mg, Intramuscular, Q6H PRN     Assessment and Plan        Chronic medical issues:  has no past medical history on file..  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  Add   Diet changed to cardiac   Continue supportive care  Continue checking vital signs q4hrs.  Reviewed and restarted appropriate home medications.     DVT prophylaxis initiated   Nurse notified to page me if any changes occur     Consults: pulmonologist   I have personally reviewed the specialist documentation and/or have spoken to the specialist with regard to the care of this patient; recommendations are noted above.     Anticipated discharge and Disposition when medically stable: Discharge home in the morning     Therapy: PT/OT/Speech--no indication     Nutrition: cardiac diet     _______________________________________________________________________________________________________________________________      This is a critical care document  Critical Care Diagnosis:hypertensive urgency   Critical care interventions: hands on evaluation, review of labs/radiographs/records and discussions; assessing and managing the high probability of imminent or life-threatening deterioration of cardiorespiratory status.  Critical care time spent > 40 minutes.     I have spent 40 minutes on the day of the visit; time  spent includes face to face time and non-face to face time preparing to see the patient (eg, review of tests), independently reviewing and interpreting medical records, both past and current; documenting clinical information in the electronic or other health record, and communicating results to the patient/family/caregiver and care coordinator and nursing team.      All diagnosis and differential diagnosis have been reviewed,  interpreted and communicated appropriately to care team. assessment and plan has been documented; I have personally reviewed the labs and test results that are presently available and pertinent to this hospital course; I have reviewed medical records based upon their availability.    All of the patient's questions have been  addressed and answered. Patient's is agreeable to the above stated plan.   I will continue to monitor closely and make adjustments to medical management as needed.    Brandy Galicia,      01/05/2025     This note was created with the assistance of Dragon voice recognition software. There may be transcription errors as a result of using this technology however minimal. Effort has been made to assure accuracy of transcription but any obvious errors or omissions should be clarified with the author of the document.

## 2025-01-05 NOTE — PLAN OF CARE
Problem: Adult Inpatient Plan of Care  Goal: Plan of Care Review  Outcome: Progressing  Goal: Patient-Specific Goal (Individualized)  Outcome: Progressing  Goal: Absence of Hospital-Acquired Illness or Injury  Outcome: Progressing  Goal: Optimal Comfort and Wellbeing  Outcome: Progressing  Goal: Readiness for Transition of Care  Outcome: Progressing     Problem: Stroke, Intracerebral Hemorrhage  Goal: Optimal Coping  Outcome: Progressing  Goal: Optimal Functional Ability  Outcome: Progressing  Goal: Effective Oxygenation and Ventilation  Outcome: Progressing

## 2025-01-06 VITALS
OXYGEN SATURATION: 95 % | HEIGHT: 71 IN | WEIGHT: 220 LBS | HEART RATE: 101 BPM | BODY MASS INDEX: 30.8 KG/M2 | DIASTOLIC BLOOD PRESSURE: 94 MMHG | TEMPERATURE: 98 F | SYSTOLIC BLOOD PRESSURE: 138 MMHG | RESPIRATION RATE: 18 BRPM

## 2025-01-06 PROBLEM — I61.9 NONTRAUMATIC INTRACEREBRAL HEMORRHAGE: Status: ACTIVE | Noted: 2025-01-06

## 2025-01-06 PROCEDURE — 25000003 PHARM REV CODE 250: Performed by: STUDENT IN AN ORGANIZED HEALTH CARE EDUCATION/TRAINING PROGRAM

## 2025-01-06 PROCEDURE — 25000003 PHARM REV CODE 250: Performed by: INTERNAL MEDICINE

## 2025-01-06 PROCEDURE — 25000003 PHARM REV CODE 250

## 2025-01-06 PROCEDURE — S4991 NICOTINE PATCH NONLEGEND: HCPCS

## 2025-01-06 RX ORDER — LOSARTAN POTASSIUM 100 MG/1
100 TABLET ORAL DAILY
Qty: 30 TABLET | Refills: 0 | Status: SHIPPED | OUTPATIENT
Start: 2025-01-06

## 2025-01-06 RX ORDER — NIFEDIPINE 60 MG/1
60 TABLET, EXTENDED RELEASE ORAL DAILY
Qty: 30 TABLET | Refills: 0 | Status: SHIPPED | OUTPATIENT
Start: 2025-01-06

## 2025-01-06 RX ORDER — CHLORTHALIDONE 50 MG/1
50 TABLET ORAL DAILY
Qty: 30 TABLET | Refills: 0 | Status: SHIPPED | OUTPATIENT
Start: 2025-01-06

## 2025-01-06 RX ORDER — LEVETIRACETAM 500 MG/1
500 TABLET ORAL 2 TIMES DAILY
Qty: 60 TABLET | Refills: 0 | Status: SHIPPED | OUTPATIENT
Start: 2025-01-06

## 2025-01-06 RX ORDER — HYDRALAZINE HYDROCHLORIDE 50 MG/1
50 TABLET, FILM COATED ORAL EVERY 8 HOURS
Qty: 90 TABLET | Refills: 0 | Status: SHIPPED | OUTPATIENT
Start: 2025-01-06

## 2025-01-06 RX ORDER — CARVEDILOL 6.25 MG/1
6.25 TABLET ORAL 2 TIMES DAILY
Qty: 60 TABLET | Refills: 0 | Status: SHIPPED | OUTPATIENT
Start: 2025-01-06

## 2025-01-06 RX ADMIN — LOSARTAN POTASSIUM 100 MG: 50 TABLET, FILM COATED ORAL at 09:01

## 2025-01-06 RX ADMIN — CARVEDILOL 6.25 MG: 3.12 TABLET, FILM COATED ORAL at 09:01

## 2025-01-06 RX ADMIN — NIFEDIPINE 60 MG: 60 TABLET, FILM COATED, EXTENDED RELEASE ORAL at 09:01

## 2025-01-06 RX ADMIN — CHLORTHALIDONE 50 MG: 25 TABLET ORAL at 09:01

## 2025-01-06 RX ADMIN — LEVETIRACETAM 500 MG: 500 TABLET, FILM COATED ORAL at 09:01

## 2025-01-06 RX ADMIN — HYDRALAZINE HYDROCHLORIDE 50 MG: 50 TABLET ORAL at 06:01

## 2025-01-06 RX ADMIN — NICOTINE 1 PATCH: 21 PATCH, EXTENDED RELEASE TRANSDERMAL at 09:01

## 2025-01-06 NOTE — DISCHARGE SUMMARY
[facility] Hospital Medicine Discharge Summary    Admit Date: 12/31/2024  Discharge Date and Time: 1/6/20253:11 PM    Admitting Physician: GLORIA Team  Discharging Physician: Brandy Galicia DO.  Primary Care Physician: No primary care provider on file.    Discharge Diagnoses:  ***    Hospital Course:   ***        Hospital course and discharge care plan has been discussed with patient/and or family , patient/ and or family  voices understanding and agreement with plan. All questions have been answered to the best of my ability. Patient is advised to return to ED or call 911 in case of emergency and or if symptoms worsen.      Vitals:  VITAL SIGNS: 24 HRS MIN & MAX LAST   Temp  Min: 97.7 °F (36.5 °C)  Max: 98.5 °F (36.9 °C) 97.7 °F (36.5 °C)   BP  Min: 105/70  Max: 138/94 (!) 138/94   Pulse  Min: 80  Max: 101  101   Resp  Min: 18  Max: 18 18   SpO2  Min: 94 %  Max: 96 % 95 %       Physical Exam:    General: Appears comfortable, no acute distress.  Integumentary: Warm, dry, intact.    Neuro: ******      Musculoskeletal: Purposeful movement noted.   Respiratory: No accessory muscle use. Breath sounds are equal.  Cardiovascular: Regular rate. No peripheral edema.      _______________________________________________________________________________________________________________________________________________________    Explained in detail to the patient about the discharge plan, medications, and follow-up visits. Pt understands and agrees with the treatment plan  Discharge Disposition: Home or Self Care   Discharged Condition: stable  Diet-    Medications Per DC med rec  Activities as tolerated   Follow-up Information       No, Primary Doctor Follow up.    Why: Needs PCP SPOKE WITH KI; STATED THAT [facility] WILL CONTACT PATIENT WITH AN APPT DATE.                         For further questions contact hospitalist office    Discharge time 33 minutes    For worsening symptoms, chest pain, shortness  of breath, increased abdominal pain, high grade fever, stroke or stroke like symptoms, immediately go to the nearest Emergency Room or call 911 as soon as possible.      Brandy Orantes M.D, on 1/6/2025. at 3:11 PM.    Please see below for significant diagnostic studies and test.   _______________________________________________________________________________________________________________________________________    Significant Diagnostic Studies: See Full reports for all details  Procedures Performed: No admission procedures for hospital encounter.         Recent Labs   Lab 01/01/25  0733 01/02/25  0349 01/03/25  0343   WBC 7.78 9.86 10.13   RBC 5.26 5.39 5.31   HGB 16.3 16.6 16.4   HCT 49.4 50.6 48.9   MCV 93.9 93.9 92.1   MCH 31.0 30.8 30.9   MCHC 33.0 32.8* 33.5   RDW 14.4 14.6 14.3    207 171   MPV 11.4* 11.0* 10.8*       Recent Labs   Lab 01/01/25  0733 01/02/25  0349 01/03/25  0343    138 139   K 4.4 4.2 4.1   * 109* 111*   CO2 19* 20* 19*   BUN 10.6 13.9 16.8   CREATININE 1.25 1.36* 1.32*   CALCIUM 9.0 8.6 8.6   MG 2.10 2.30 2.20   ALBUMIN 4.2 3.8 3.7   ALKPHOS 64 58 61   ALT 32 25 24   AST 17 13 10   BILITOT 0.7 0.5 0.7        Microbiology Results (last 7 days)       ** No results found for the last 168 hours. **             CT Head Without Contrast  Narrative: EXAMINATION:  CT HEAD WITHOUT CONTRAST    CLINICAL HISTORY:  Stroke, follow up;    TECHNIQUE:  Multiple axial images were obtained from the base of the brain to the vertex without contrast administration.  Sagittal and coronal reconstructions were performed. .Automatic exposure control  (AEC) is utilized to reduce patient radiation exposure.    COMPARISON:  12/31/2024    FINDINGS:  There is no intracranial mass or lesion seen.  There is a stable left basal ganglia hemorrhage seen.  There is a mild amount of surrounding cytotoxic edema.  No new areas of hemorrhage are seen.  No significant midline shift is seen..  No infarct is  seen.  The ventricles and basilar cisterns appear normal.  Brain parenchyma appears grossly unremarkable.    Posterior fossa appears normal.  The calvarium is intact.  The paranasal sinuses appear grossly unremarkable.  Impression: No change in the intracranial hemorrhage    Electronically signed by: Albert Alvarez  Date:    12/31/2024  Time:    15:42  X-Ray Chest AP Portable  Narrative: EXAMINATION:  XR CHEST AP PORTABLE    CLINICAL HISTORY:  Other symptoms and signs involving the nervous system    COMPARISON:  Chest x-ray dated 09/21/2017    FINDINGS:  The heart is normal in size.  The lungs are clear.  There is no pleural effusion or visible pneumothorax.  Impression: No acute abnormality of the chest.    Electronically signed by: Mali Simpson  Date:    12/31/2024  Time:    11:33  CTA Head and Neck (xpd)  Narrative: EXAMINATION:  CTA HEAD AND NECK (XPD)    CLINICAL HISTORY:  Neuro deficit, acute, stroke suspected;    TECHNIQUE:  Non contrast low dose axial images were obtained through the head.  CT angiogram was performed from the level of the elaine to the top of the head following the IV administration 100 cc of Isovue 370 contrast .  Sagittal and coronal reconstructions and maximum intensity projection reconstructions were performed. Arterial stenosis percentages are based on NASCET measurement criteria.  Additional multiplanar reconstructions were performed on post processed imaging.  Automatic exposure control (AEC) is utilized to reduce patient radiation exposure.    COMPARISON:  None    FINDINGS:  Source images: No intracranial mass lesion is seen.  There is a focal rounded area hemorrhage in the basal ganglia side which is stable..  No infarct is seen.  Ventricles and basilar cisterns appear grossly unremarkable.  No cervical mass or lesion is seen.  No abnormal lymphadenopathy seen.  Thyroid appears normal.  Larynx appears normal.  Trachea is midline.  Thoracic inlet appears normal.    Vascular  images: Aortic arch is normal in caliber.  There is a 3 vessel arch seen.  The common carotid arteries are widely patent.  No significant plaque is seen in the carotid bulbs or proximal internal carotid arteries.  The distal internal carotid arteries are widely patent and seen to level the petrous ridge and clinoid supraclinoid portions with only minimal amount of calcifications seen in the clinoid portion of the right internal carotid artery.  No significant stenosis is seen.    The MCAs are patent.  The M1 segments, M2 segments M3 segments are patent.  No aneurysm or obstruction is seen.  A1 segments are patent.  Anterior cerebral arteries are widely patent.  No aneurysm or obstruction is seen.    Both vertebral arteries are patent.  Left vertebral artery is dominant.  No evidence of dissection is seen.  No fibromuscular dysplasia is seen.  Both vertebral arteries perfuse a normal appearing basilar artery.    The posterior cerebral arteries are widely patent.  No aneurysm or obstruction is seen.    .  Impression: No evidence of aneurysm or large vessel occlusion seen    Stable left basal ganglia hemorrhage    Electronically signed by: Albert Alvarez  Date:    12/31/2024  Time:    10:07  CT HEAD FOR STROKE  Narrative: EXAMINATION:  CT HEAD FOR STROKE    CLINICAL HISTORY:  Neuro deficit, acute, stroke suspected;    TECHNIQUE:  Axial scans were obtained from skull base to the vertex.    Coronal and sagittal reconstructions obtained from the axial data.    Automatic exposure control was utilized to limit radiation dose.    Contrast: None    Radiation Dose:    Total DLP: 1017 mGy*cm    COMPARISON:  None    FINDINGS:  Small left basal ganglia hematoma measures 1.7 cm in size with mild surrounding edema.  Patchy hypodensities in the subcortical and periventricular white matter likely represent chronic microvascular ischemic changes.    There is no significant mass effect or midline shift.  The basal cisterns are patent.   The ventricles are normal in size.  There is no abnormal extra-axial fluid collection.  The calvarium and skull base are intact.  Impression: Small left basal ganglia hematoma.    Code fast findings given to Dr. Huerta at the time of dictation.    Electronically signed by: Mali Simpson  Date:    12/31/2024  Time:    09:49         Medication List        START taking these medications      carvediloL 6.25 MG tablet  Commonly known as: COREG  Take 1 tablet (6.25 mg total) by mouth 2 (two) times daily.     chlorthalidone 50 MG Tab  Commonly known as: HYGROTEN  Take 1 tablet (50 mg total) by mouth once daily.     hydrALAZINE 50 MG tablet  Commonly known as: APRESOLINE  Take 1 tablet (50 mg total) by mouth every 8 (eight) hours.     levETIRAcetam 500 MG Tab  Commonly known as: KEPPRA  Take 1 tablet (500 mg total) by mouth 2 (two) times daily.     losartan 100 MG tablet  Commonly known as: COZAAR  Take 1 tablet (100 mg total) by mouth once daily.     NIFEdipine 60 MG (OSM) 24 hr tablet  Commonly known as: PROCARDIA-XL  Take 1 tablet (60 mg total) by mouth once daily.               Where to Get Your Medications        These medications were sent to [facility] - [address]  [address] [place] [address] Floor [address] [address]      Phone: [phone]   carvediloL 6.25 MG tablet  chlorthalidone 50 MG Tab  hydrALAZINE 50 MG tablet  levETIRAcetam 500 MG Tab  losartan 100 MG tablet  NIFEdipine 60 MG (OSM) 24 hr tablet

## 2025-03-24 ENCOUNTER — LAB VISIT (OUTPATIENT)
Dept: LAB | Facility: HOSPITAL | Age: 50
End: 2025-03-24
Attending: DENTIST
Payer: MEDICAID

## 2025-03-24 ENCOUNTER — TELEPHONE (OUTPATIENT)
Dept: INTERNAL MEDICINE | Facility: CLINIC | Age: 50
End: 2025-03-24

## 2025-03-24 ENCOUNTER — OFFICE VISIT (OUTPATIENT)
Dept: INTERNAL MEDICINE | Facility: CLINIC | Age: 50
End: 2025-03-24
Payer: MEDICAID

## 2025-03-24 VITALS
BODY MASS INDEX: 29.73 KG/M2 | DIASTOLIC BLOOD PRESSURE: 76 MMHG | SYSTOLIC BLOOD PRESSURE: 131 MMHG | RESPIRATION RATE: 16 BRPM | HEIGHT: 71 IN | WEIGHT: 212.38 LBS | HEART RATE: 78 BPM | OXYGEN SATURATION: 99 % | TEMPERATURE: 98 F

## 2025-03-24 DIAGNOSIS — I10 PRIMARY HYPERTENSION: ICD-10-CM

## 2025-03-24 DIAGNOSIS — I61.0 NONTRAUMATIC SUBCORTICAL HEMORRHAGE OF LEFT CEREBRAL HEMISPHERE: ICD-10-CM

## 2025-03-24 DIAGNOSIS — I61.0 NONTRAUMATIC SUBCORTICAL HEMORRHAGE OF LEFT CEREBRAL HEMISPHERE: Primary | ICD-10-CM

## 2025-03-24 DIAGNOSIS — Z12.11 SPECIAL SCREENING FOR MALIGNANT NEOPLASMS, COLON: ICD-10-CM

## 2025-03-24 DIAGNOSIS — Z76.89 ENCOUNTER TO ESTABLISH CARE: ICD-10-CM

## 2025-03-24 LAB
25(OH)D3+25(OH)D2 SERPL-MCNC: 29 NG/ML (ref 30–80)
ALBUMIN SERPL-MCNC: 4.2 G/DL (ref 3.5–5)
ALBUMIN/GLOB SERPL: 1.2 RATIO (ref 1.1–2)
ALP SERPL-CCNC: 54 UNIT/L (ref 40–150)
ALT SERPL-CCNC: 16 UNIT/L (ref 0–55)
ANION GAP SERPL CALC-SCNC: 8 MEQ/L
AST SERPL-CCNC: 9 UNIT/L (ref 11–45)
BASOPHILS # BLD AUTO: 0.05 X10(3)/MCL
BASOPHILS NFR BLD AUTO: 0.6 %
BILIRUB SERPL-MCNC: 0.5 MG/DL
BUN SERPL-MCNC: 26.9 MG/DL (ref 8.9–20.6)
CALCIUM SERPL-MCNC: 9.6 MG/DL (ref 8.4–10.2)
CHLORIDE SERPL-SCNC: 104 MMOL/L (ref 98–107)
CHOLEST SERPL-MCNC: 196 MG/DL
CHOLEST/HDLC SERPL: 5 {RATIO} (ref 0–5)
CO2 SERPL-SCNC: 26 MMOL/L (ref 22–29)
CREAT SERPL-MCNC: 1.45 MG/DL (ref 0.72–1.25)
CREAT/UREA NIT SERPL: 19
EOSINOPHIL # BLD AUTO: 0.24 X10(3)/MCL (ref 0–0.9)
EOSINOPHIL NFR BLD AUTO: 3.1 %
ERYTHROCYTE [DISTWIDTH] IN BLOOD BY AUTOMATED COUNT: 13.9 % (ref 11.5–17)
EST. AVERAGE GLUCOSE BLD GHB EST-MCNC: 114 MG/DL
GFR SERPLBLD CREATININE-BSD FMLA CKD-EPI: 59 ML/MIN/1.73/M2
GLOBULIN SER-MCNC: 3.5 GM/DL (ref 2.4–3.5)
GLUCOSE SERPL-MCNC: 99 MG/DL (ref 74–100)
HBA1C MFR BLD: 5.6 %
HCT VFR BLD AUTO: 45.2 % (ref 42–52)
HCV AB SERPL QL IA: REACTIVE
HDLC SERPL-MCNC: 37 MG/DL (ref 35–60)
HGB BLD-MCNC: 14.9 G/DL (ref 14–18)
HIV 1+2 AB+HIV1 P24 AG SERPL QL IA: NONREACTIVE
IMM GRANULOCYTES # BLD AUTO: 0.02 X10(3)/MCL (ref 0–0.04)
IMM GRANULOCYTES NFR BLD AUTO: 0.3 %
IRON SATN MFR SERPL: 26 % (ref 20–50)
IRON SERPL-MCNC: 64 UG/DL (ref 65–175)
LDLC SERPL CALC-MCNC: 115 MG/DL (ref 50–140)
LYMPHOCYTES # BLD AUTO: 2.48 X10(3)/MCL (ref 0.6–4.6)
LYMPHOCYTES NFR BLD AUTO: 31.7 %
MCH RBC QN AUTO: 30 PG (ref 27–31)
MCHC RBC AUTO-ENTMCNC: 33 G/DL (ref 33–36)
MCV RBC AUTO: 91.1 FL (ref 80–94)
MONOCYTES # BLD AUTO: 0.64 X10(3)/MCL (ref 0.1–1.3)
MONOCYTES NFR BLD AUTO: 8.2 %
NEUTROPHILS # BLD AUTO: 4.39 X10(3)/MCL (ref 2.1–9.2)
NEUTROPHILS NFR BLD AUTO: 56.1 %
NRBC BLD AUTO-RTO: 0 %
PLATELET # BLD AUTO: 244 X10(3)/MCL (ref 130–400)
PMV BLD AUTO: 10.8 FL (ref 7.4–10.4)
POTASSIUM SERPL-SCNC: 3.7 MMOL/L (ref 3.5–5.1)
PROT SERPL-MCNC: 7.7 GM/DL (ref 6.4–8.3)
RBC # BLD AUTO: 4.96 X10(6)/MCL (ref 4.7–6.1)
SODIUM SERPL-SCNC: 138 MMOL/L (ref 136–145)
TIBC SERPL-MCNC: 181 UG/DL (ref 60–240)
TIBC SERPL-MCNC: 245 UG/DL (ref 250–450)
TRANSFERRIN SERPL-MCNC: 213 MG/DL (ref 174–364)
TRIGL SERPL-MCNC: 222 MG/DL (ref 34–140)
TSH SERPL-ACNC: 1.54 UIU/ML (ref 0.35–4.94)
VIT B12 SERPL-MCNC: 440 PG/ML (ref 213–816)
VLDLC SERPL CALC-MCNC: 44 MG/DL
WBC # BLD AUTO: 7.82 X10(3)/MCL (ref 4.5–11.5)

## 2025-03-24 PROCEDURE — 87389 HIV-1 AG W/HIV-1&-2 AB AG IA: CPT

## 2025-03-24 PROCEDURE — 86803 HEPATITIS C AB TEST: CPT

## 2025-03-24 PROCEDURE — 82306 VITAMIN D 25 HYDROXY: CPT

## 2025-03-24 PROCEDURE — 80053 COMPREHEN METABOLIC PANEL: CPT

## 2025-03-24 PROCEDURE — 84443 ASSAY THYROID STIM HORMONE: CPT

## 2025-03-24 PROCEDURE — 80061 LIPID PANEL: CPT

## 2025-03-24 PROCEDURE — 83540 ASSAY OF IRON: CPT

## 2025-03-24 PROCEDURE — 82607 VITAMIN B-12: CPT

## 2025-03-24 PROCEDURE — 85025 COMPLETE CBC W/AUTO DIFF WBC: CPT

## 2025-03-24 PROCEDURE — 99213 OFFICE O/P EST LOW 20 MIN: CPT | Mod: PBBFAC

## 2025-03-24 PROCEDURE — 36415 COLL VENOUS BLD VENIPUNCTURE: CPT

## 2025-03-24 PROCEDURE — 83036 HEMOGLOBIN GLYCOSYLATED A1C: CPT

## 2025-03-24 RX ORDER — LOSARTAN POTASSIUM 100 MG/1
100 TABLET ORAL DAILY
Qty: 90 TABLET | Refills: 3 | Status: SHIPPED | OUTPATIENT
Start: 2025-03-24

## 2025-03-24 RX ORDER — CARVEDILOL 6.25 MG/1
6.25 TABLET ORAL 2 TIMES DAILY
Qty: 180 TABLET | Refills: 3 | Status: SHIPPED | OUTPATIENT
Start: 2025-03-24

## 2025-03-24 RX ORDER — NIFEDIPINE 60 MG/1
60 TABLET, EXTENDED RELEASE ORAL DAILY
Qty: 90 TABLET | Refills: 3 | Status: SHIPPED | OUTPATIENT
Start: 2025-03-24

## 2025-03-24 NOTE — TELEPHONE ENCOUNTER
Called to inform patient regarding positive Hepatitis C antibody. Spoke to Meseret, patient's S, regarding obtaining a hepatitis C RNA to quantify viral load.    SO voiced understanding.    Sudhir Yates MD  [facility] INTERNAL MEDICINE PGY-3  03/24/2025 1:34 PM  [facility]

## 2025-03-24 NOTE — PROGRESS NOTES
[facility]  CLINIC NOTE    Patient Name: Ramon Pop  YOB: 1975    PRESENTING HISTORY       History of Present Illness:  Mr. Ramon Pop is a 49 y.o. male w/ an active medical problem list including non traumatic IPH (basal ganglia), Hx of nicotine use, Hx of alcohol use, seizures (stroke associated) comes to establish care.    3/24/2025: Patient states he has done better from a neurologic standpoint, though with some lingering weakness and maladaptive muscle pain.       CURRENT MEDICATIONS      Current Outpatient Medications on File Prior to Visit   Medication Sig    carvediloL (COREG) 6.25 MG tablet Take 1 tablet (6.25 mg total) by mouth 2 (two) times daily.    chlorthalidone (HYGROTEN) 50 MG Tab Take 1 tablet (50 mg total) by mouth once daily.    hydrALAZINE (APRESOLINE) 50 MG tablet Take 1 tablet (50 mg total) by mouth every 8 (eight) hours.    levETIRAcetam (KEPPRA) 500 MG Tab Take 1 tablet (500 mg total) by mouth 2 (two) times daily.    losartan (COZAAR) 100 MG tablet Take 1 tablet (100 mg total) by mouth once daily.    NIFEdipine (PROCARDIA-XL) 60 MG (OSM) 24 hr tablet Take 1 tablet (60 mg total) by mouth once daily.     No current facility-administered medications on file prior to visit.         Review of Systems   All other systems reviewed and are negative.      PAST HISTORY:     Past Medical History:   Diagnosis Date    Stroke        History reviewed. No pertinent surgical history.    Family History   Problem Relation Name Age of Onset    Hypertension Mother      Cancer Mother          Lung       Social History     Socioeconomic History    Marital status: Significant Other   Tobacco Use    Smoking status: Every Day     Current packs/day: 1.00     Average packs/day: 1 pack/day for 38.7 years (38.7 ttl pk-yrs)     Types: Cigarettes     Start date: 07/1986    Smokeless tobacco: Never   Substance and Sexual Activity    Alcohol use: Not Currently     Comment: A Fifth  "to A Half Fifth Daily    Drug use: Yes     Types: Marijuana, Cocaine     Comment: Still Smokes Marijuana    Sexual activity: Yes     Partners: Female     Birth control/protection: None     Social Drivers of Health     Financial Resource Strain: Patient Declined (12/31/2024)    Overall Financial Resource Strain (CARDIA)     Difficulty of Paying Living Expenses: Patient declined   Food Insecurity: Patient Declined (12/31/2024)    Hunger Vital Sign     Worried About Running Out of Food in the Last Year: Patient declined     Ran Out of Food in the Last Year: Patient declined   Transportation Needs: Patient Declined (12/31/2024)    TRANSPORTATION NEEDS     Transportation : Patient declined   Stress: Patient Declined (12/31/2024)    [employer] - Occupational Stress Questionnaire     Feeling of Stress : Patient declined   Housing Stability: Patient Declined (12/31/2024)    Housing Stability Vital Sign     Unable to Pay for Housing in the Last Year: Patient declined     Homeless in the Last Year: Patient declined       Review of patient's allergies indicates:   Allergen Reactions    Penicillins        OBJECTIVE:   Vital Signs:  Vitals:    03/24/25 0946   BP: 131/76   Pulse: 78   Resp: 16   Temp: 97.9 °F (36.6 °C)   TempSrc: Oral   SpO2: 99%   Weight: 96.3 kg (212 lb 6.4 oz)   Height: 5' 10.5" (1.791 m)       No results found for this or any previous visit (from the past 24 hours).      Physical Exam  Constitutional:       Appearance: Normal appearance.   HENT:      Head: Normocephalic.   Eyes:      Extraocular Movements: Extraocular movements intact.      Conjunctiva/sclera: Conjunctivae normal.      Pupils: Pupils are equal, round, and reactive to light.   Cardiovascular:      Rate and Rhythm: Normal rate and regular rhythm.   Pulmonary:      Effort: Pulmonary effort is normal.   Abdominal:      General: Abdomen is flat.   Musculoskeletal:         General: Normal range of motion.   Skin:     " General: Skin is warm.   Neurological:      General: No focal deficit present.      Mental Status: He is alert and oriented to person, place, and time.         Laboratory  CMP:   Recent Labs   Lab 01/01/25  0733 01/02/25  0349 01/03/25  0343   Sodium 140 138 139   Potassium 4.4 4.2 4.1   CO2 19 L 20 L 19 L   Blood Urea Nitrogen 10.6 13.9 16.8   Creatinine 1.25 1.36 H 1.32 H   Glucose 103 H 118 H 109 H   Calcium 9.0 8.6 8.6   Albumin 4.2 3.8 3.7   Bilirubin Total 0.7 0.5 0.7   AST 17 13 10   ALT 32 25 24   ALP 64 58 61     CBC:   Recent Labs   Lab 01/01/25  0733 01/02/25  0349 01/03/25  0343   WBC 7.78 9.86 10.13   Neut # 4.88 6.49 6.78   RBC 5.26 5.39 5.31   Hgb 16.3 16.6 16.4   Hct 49.4 50.6 48.9   Platelet 214 207 171   MCV 93.9 93.9 92.1   RDW 14.4 14.6 14.3     FLP:   Recent Labs   Lab 12/31/24  1025   Cholesterol Total 188   HDL Cholesterol 41   LDL Cholesterol 111.00   Triglyceride 178 H     DM:   Recent Labs   Lab 01/01/25  0733 01/02/25  0349 01/03/25  0343   Creatinine 1.25 1.36 H 1.32 H     Thyroid:   Recent Labs   Lab 12/31/24  1025   TSH 1.662     Anemia:   Recent Labs   Lab 01/03/25  0343   Hgb 16.4   Hct 48.9           ASSESSMENT & PLAN:   Encounter to Establish Care  - Labs Below    Hx of Left Basal Ganglia Hemorrhage  - Keppra 500mg BID, will d/c once finished with course  - [id number] 12/31/2024  - ASCVD risk 9.2-14.5%, would qualify for moderate intensity statin however in the setting of ICH there was previous data for worsening stroke outcomes however this is likely association vs causative, will start crestor    Hx of HTN  - was prescribed Coreg 6.25 (MWF in AM, Daily Evening), Chlorthalidone 50mg (4x a week), Hydralazine 50 TID, Losartan 100mg (taking half), Nifedipine 60mg (not taking)  - Will continue Coreg BID, Losartan 100mg, Nifedipine 60mg  - Monitor BP    Ramon was seen today for hypertension.    Diagnoses and all orders for this visit:    Nontraumatic subcortical hemorrhage of left cerebral  hemisphere  -     Hemoglobin A1C; Future  -     Comprehensive Metabolic Panel; Future  -     CBC Auto Differential; Future  -     Lipid Panel; Future  -     TSH; Future  -     Vitamin B12; Future  -     HIV 1/2 Ag/Ab (4th Gen); Future  -     Hepatitis C Antibody; Future  -     Iron and TIBC; Future  -     Vitamin D; Future    Primary hypertension  -     Hemoglobin A1C; Future  -     Comprehensive Metabolic Panel; Future  -     CBC Auto Differential; Future  -     Lipid Panel; Future  -     TSH; Future  -     Vitamin B12; Future  -     HIV 1/2 Ag/Ab (4th Gen); Future  -     Hepatitis C Antibody; Future  -     Iron and TIBC; Future  -     Vitamin D; Future    Special screening for malignant neoplasms, colon  -     Cologuard Screening (Multitarget Stool DNA); Future  -     Cologuard Screening (Multitarget Stool DNA)    Encounter to establish care  -     Hemoglobin A1C; Future  -     Comprehensive Metabolic Panel; Future  -     CBC Auto Differential; Future  -     Lipid Panel; Future  -     TSH; Future  -     Vitamin B12; Future  -     HIV 1/2 Ag/Ab (4th Gen); Future  -     Hepatitis C Antibody; Future  -     Iron and TIBC; Future  -     Vitamin D; Future        Health Maintenance Due   Topic Date Due    Hepatitis C Screening  Never done    HIV Screening  Never done    TETANUS VACCINE  Never done    Hemoglobin A1c (Diabetic Prevention Screening)  Never done    Colorectal Cancer Screening  Never done    Influenza Vaccine (1) Never done    COVID-19 Vaccine ( season) Never done           Health Maintenance/ Wellness  Pneumococcal vaccine: Deferred  TDAP: Deferred  Influenza vaccine: Deferred  Shingrix vaccine: Deferred  AAA U/S screenin  Screening colonoscopy: ordered  Lung Cancer Screenin  Hepatitis Panel: Ordered  HIV - Ordered    RTC 6 months with labs today    Future Appointments     No future appointments.     Orders Placed This Encounter   Procedures    Cologuard Screening (Multitarget Stool DNA)      Standing Status:   Future     Number of Occurrences:   1     Expected Date:   3/24/2025     Expiration Date:   5/23/2026     Send normal result to authorizing provider's In Basket if patient is active on MyChart::   Yes    Hemoglobin A1C     Standing Status:   Future     Expected Date:   3/24/2025     Expiration Date:   5/23/2026    Comprehensive Metabolic Panel     Standing Status:   Future     Expected Date:   3/24/2025     Expiration Date:   5/23/2026    CBC Auto Differential     Standing Status:   Future     Expected Date:   3/24/2025     Expiration Date:   5/23/2026    Lipid Panel     Standing Status:   Future     Expected Date:   3/24/2025     Expiration Date:   5/23/2026    TSH     Standing Status:   Future     Expected Date:   3/24/2025     Expiration Date:   5/23/2026    Vitamin B12     Standing Status:   Future     Expected Date:   3/24/2025     Expiration Date:   5/23/2026    HIV 1/2 Ag/Ab (4th Gen)     Standing Status:   Future     Expected Date:   3/24/2025     Expiration Date:   5/23/2026     Release to patient:   Immediate    Hepatitis C Antibody     Standing Status:   Future     Expected Date:   3/24/2025     Expiration Date:   5/23/2026     Release to patient:   Immediate    Iron and TIBC     Standing Status:   Future     Expected Date:   3/24/2025     Expiration Date:   5/23/2026    Vitamin D     Standing Status:   Future     Expected Date:   3/24/2025     Expiration Date:   5/23/2026         Discussed with Dr. Daugherty  - Staff Attestation to Follow    Sudhir Yates MD  [facility] INTERNAL MEDICINE PGY-3  03/24/2025 9:29 AM  [facility]

## 2025-03-27 RX ORDER — CARVEDILOL 6.25 MG/1
6.25 TABLET ORAL 2 TIMES DAILY
Qty: 180 TABLET | Refills: 3 | OUTPATIENT
Start: 2025-03-27

## 2025-03-27 RX ORDER — NIFEDIPINE 60 MG/1
60 TABLET, EXTENDED RELEASE ORAL DAILY
Qty: 90 TABLET | Refills: 3 | OUTPATIENT
Start: 2025-03-27

## 2025-03-27 RX ORDER — LOSARTAN POTASSIUM 100 MG/1
100 TABLET ORAL DAILY
Qty: 90 TABLET | Refills: 3 | OUTPATIENT
Start: 2025-03-27

## 2025-03-28 ENCOUNTER — LAB VISIT (OUTPATIENT)
Dept: LAB | Facility: HOSPITAL | Age: 50
End: 2025-03-28
Payer: MEDICAID

## 2025-03-28 DIAGNOSIS — Z76.89 ENCOUNTER TO ESTABLISH CARE: ICD-10-CM

## 2025-03-28 PROCEDURE — 36415 COLL VENOUS BLD VENIPUNCTURE: CPT

## 2025-03-28 PROCEDURE — 87522 HEPATITIS C REVRS TRNSCRPJ: CPT

## 2025-04-01 LAB
HCV RNA SERPL NAA+PROBE-ACNC: NOT DETECTED K[IU]/ML
HCV RNA SERPL NAA+PROBE-LOG IU: NOT DETECTED {LOG_IU}/ML

## 2025-04-07 ENCOUNTER — TELEPHONE (OUTPATIENT)
Dept: INTERNAL MEDICINE | Facility: CLINIC | Age: 50
End: 2025-04-07
Payer: MEDICAID

## 2025-04-07 ENCOUNTER — RESULTS FOLLOW-UP (OUTPATIENT)
Dept: INTERNAL MEDICINE | Facility: CLINIC | Age: 50
End: 2025-04-07

## 2025-04-07 NOTE — TELEPHONE ENCOUNTER
----- Message from Nurse Ravindra sent at 4/1/2025  1:51 PM CDT -----  Call patient to give results however there was no answer. Left voicemail for a return call to this non urgent matter.  ----- Message -----  From: Sudhir Yates MD  Sent: 4/1/2025   1:14 PM CDT  To: [facility] Internal Medicine Residents Clinical [place]#    Please inform patient Hep C RNA was negative. No further follow up needed on this.  ----- Message -----  From: Lab, Background User  Sent: 4/1/2025  12:11 PM CDT  To: Sudhir Yates MD

## 2025-04-07 NOTE — TELEPHONE ENCOUNTER
Called patient and date of birth verified. Notified patient that Hep C RNA is negative. Patient verbalized understanding.